# Patient Record
Sex: FEMALE | Race: WHITE | URBAN - METROPOLITAN AREA
[De-identification: names, ages, dates, MRNs, and addresses within clinical notes are randomized per-mention and may not be internally consistent; named-entity substitution may affect disease eponyms.]

---

## 2017-02-05 RX ORDER — VORTIOXETINE 20 MG/1
TABLET, FILM COATED ORAL
Qty: 90 TAB | Refills: 1 | Status: SHIPPED | OUTPATIENT
Start: 2017-02-05 | End: 2017-07-19 | Stop reason: SDUPTHER

## 2017-02-05 RX ORDER — BUPROPION HYDROCHLORIDE 150 MG/1
TABLET ORAL
Qty: 270 TAB | Refills: 0 | Status: SHIPPED | OUTPATIENT
Start: 2017-02-05 | End: 2017-05-21 | Stop reason: SDUPTHER

## 2017-03-16 RX ORDER — SIMVASTATIN 40 MG/1
TABLET, FILM COATED ORAL
Qty: 90 TAB | Refills: 3 | Status: SHIPPED | OUTPATIENT
Start: 2017-03-16 | End: 2018-04-11 | Stop reason: SDUPTHER

## 2017-04-18 RX ORDER — EZETIMIBE 10 MG/1
TABLET ORAL
Qty: 90 TAB | Refills: 2 | Status: SHIPPED | OUTPATIENT
Start: 2017-04-18 | End: 2018-02-18 | Stop reason: SDUPTHER

## 2017-07-19 RX ORDER — VORTIOXETINE 20 MG/1
TABLET, FILM COATED ORAL
Qty: 90 TAB | Refills: 1 | Status: SHIPPED | OUTPATIENT
Start: 2017-07-19 | End: 2018-03-11 | Stop reason: SDUPTHER

## 2017-09-15 ENCOUNTER — OFFICE VISIT (OUTPATIENT)
Dept: INTERNAL MEDICINE CLINIC | Age: 42
End: 2017-09-15

## 2017-09-15 VITALS
HEIGHT: 64 IN | RESPIRATION RATE: 16 BRPM | BODY MASS INDEX: 32.61 KG/M2 | WEIGHT: 191 LBS | SYSTOLIC BLOOD PRESSURE: 124 MMHG | TEMPERATURE: 98.4 F | OXYGEN SATURATION: 99 % | HEART RATE: 108 BPM | DIASTOLIC BLOOD PRESSURE: 86 MMHG

## 2017-09-15 DIAGNOSIS — G47.30 SLEEP APNEA, UNSPECIFIED TYPE: Primary | ICD-10-CM

## 2017-09-15 DIAGNOSIS — Z23 ENCOUNTER FOR IMMUNIZATION: ICD-10-CM

## 2017-09-15 DIAGNOSIS — E66.9 OBESITY (BMI 30-39.9): Chronic | ICD-10-CM

## 2017-09-15 NOTE — PATIENT INSTRUCTIONS
Sleep Apnea: Care Instructions  Your Care Instructions  Sleep apnea means that you frequently stop breathing for 10 seconds or longer during sleep. It can be mild to severe, based on the number of times an hour that you stop breathing or have slowed breathing. Blocked or narrowed airways in your nose, mouth, or throat can cause sleep apnea. Your airway can become blocked when your throat muscles and tongue relax during sleep. You can treat sleep apnea at home by making lifestyle changes. You also can use a CPAP breathing machine that keeps tissues in the throat from blocking your airway. Or your doctor may suggest that you use a breathing device while you sleep. It helps keep your airway open. This could be a device that you put in your mouth. Other examples include strips or disks that you use on your nose. In some cases, surgery may be needed to remove enlarged tissues in the throat. Follow-up care is a key part of your treatment and safety. Be sure to make and go to all appointments, and call your doctor if you are having problems. It's also a good idea to know your test results and keep a list of the medicines you take. How can you care for yourself at home? · Lose weight, if needed. It may reduce the number of times you stop breathing or have slowed breathing. · Sleep on your side. It may stop mild apnea. If you tend to roll onto your back, sew a pocket in the back of your pajama top. Put a tennis ball into the pocket, and stitch the pocket shut. This will help keep you from sleeping on your back. · Avoid alcohol and medicines such as sleeping pills and sedatives before bed. · Do not smoke. Smoking can make sleep apnea worse. If you need help quitting, talk to your doctor about stop-smoking programs and medicines. These can increase your chances of quitting for good. · Prop up the head of your bed 4 to 6 inches by putting bricks under the legs of the bed.   · Treat breathing problems, such as a stuffy nose, caused by a cold or allergies. · Try a continuous positive airway pressure (CPAP) breathing machine if your doctor recommends it. The machine keeps your airway open when you sleep. · If CPAP does not work for you, ask your doctor if you can try other breathing machines. A bilevel positive airway pressure machine uses one type of air pressure for breathing in and another type for breathing out. Another device raises or lowers air pressure as needed while you breathe. · Talk to your doctor if:  ¨ Your nose feels dry or bleeds when you use one of these machines. You may need to increase moisture in the air. A humidifier may help. ¨ Your nose is runny or stuffy from using a breathing machine. Decongestants or a corticosteroid nasal spray may help. ¨ You are sleepy during the day and it gets in the way of the normal things you do. Do not drive when you are drowsy. When should you call for help? Watch closely for changes in your health, and be sure to contact your doctor if:  · You still have sleep apnea even though you have made lifestyle changes. · You are thinking of trying a device such as CPAP. · You are having problems using a CPAP or similar machine. Where can you learn more? Go to http://renetta-guadalupe.info/. Enter K401 in the search box to learn more about \"Sleep Apnea: Care Instructions. \"  Current as of: March 25, 2017  Content Version: 11.3  © 4976-3176 STinser. Care instructions adapted under license by [employer] (which disclaims liability or warranty for this information). If you have questions about a medical condition or this instruction, always ask your healthcare professional. [place] [address] any warranty or liability for your use of this information.

## 2017-09-15 NOTE — PROGRESS NOTES
Lewis Bernard is a 38 y.o. female who presents for evaluation of excessive snoring, daytime fatigue, apnea. Did sleep test about 10 years ago that she states was inconclusive. Would like to re-address at this time. ROS:  Constitutional: negative for fevers, chills, anorexia and weight loss  Eyes:   negative for visual disturbance and irritation  ENT:   negative for tinnitus,sore throat,nasal congestion,ear pain,hoarseness  Respiratory:  negative for cough, hemoptysis, dyspnea,wheezing  CV:   negative for chest pain, palpitations, lower extremity edema  GI:   negative for nausea, vomiting, diarrhea, abdominal pain,melena  Genitourinary: negative for frequency, dysuria and hematuria  Musculoskel: negative for myalgias, arthralgias, back pain, muscle weakness, joint pain  Neurological:  negative for headaches, dizziness, focal weakness, numbness  Psychiatric:     Negative for depression or anxiety      Past Medical History:   Diagnosis Date    Anxiety     Depression     Dr. Suzanne Gunter    Hemorrhage 2002    postpartum (23 units transfused)    Hypercholesterolemia        Past Surgical History:   Procedure Laterality Date    HX ADENOIDECTOMY      +partial tonsillectomy    HX ADENOIDECTOMY      HX  SECTION  2002    HX DILATION AND CURETTAGE      HX HYSTERECTOMY  2002       Family History   Problem Relation Age of Onset    Cancer Mother      lung    COPD Mother     Emphysema Mother     Heart Disease Father     Heart Attack Father     No Known Problems Brother     Heart Attack Paternal Grandfather     Heart Disease Paternal Grandfather        Social History     Social History    Marital status:      Spouse name: N/A    Number of children: N/A    Years of education: N/A     Occupational History    Not on file.      Social History Main Topics    Smoking status: Former Smoker    Smokeless tobacco: Never Used    Alcohol use 3.0 oz/week     6 Cans of beer per week   [facility] Drug use: No    Sexual activity: Yes     Partners: Male     Other Topics Concern    Not on file     Social History Narrative            Visit Vitals    /86 (BP 1 Location: Left arm, BP Patient Position: Sitting)    Pulse (!) 108    Temp 98.4 °F (36.9 °C) (Oral)    Resp 16    Ht 5' 4\" (1.626 m)    Wt 191 lb (86.6 kg)    SpO2 99%    BMI 32.79 kg/m2       Physical Examination:   General - Well appearing female  HEENT - PERRL, TM no erythema/opacification, normal nasal turbinates, no oropharyngeal erythema or exudate, MMM  Neck - supple, no bruits, no thyroidomegaly, no lymphadenopathy  Pulm - clear to auscultation bilaterally  Cardio - RRR, normal S1 S2, no murmur  Abd - soft, nontender, no masses, no HSM  Extrem - no edema, +2 distal pulses  Neuro-  No focal deficits, CN intact     Assessment/Plan:    1. Probable perri--referral to sleep study, dr Bowser Peers  2.  hyperlipids--controlled with zetia and zocor  3. Anxiety and depression--continue wellbutrin, prn xanax  4. Routine adult health maintenance--tdap given today. Declines flu shot. Had mammogram done at [facility], and pap/pelvic with tony ruano.     rtc 6 months        Radha Barry III, DO

## 2017-09-15 NOTE — PROGRESS NOTES
Reviewed record in preparation for visit and have obtained necessary documentation. Identified pt with two pt identifiers(name and ). Chief Complaint   Patient presents with    Hypertension     follow up    Other     wants to discuss having a sleep study done       Health Maintenance Due   Topic Date Due    DTaP/Tdap/Td series (1 - Tdap) 1994    INFLUENZA AGE 9 TO ADULT  2017    PAP AKA CERVICAL CYTOLOGY  2017       Ms. Marcelo Velasco has a reminder for a \"due or due soon\" health maintenance. I have asked that she discuss health maintenance topic(s) due with Her  primary care provider. Coordination of Care Questionnaire:  :     1) Have you been to an emergency room, urgent care clinic since your last visit? yes   Hospitalized since your last visit? no             2) Have you seen or consulted any other health care providers outside of [address] [place] [address] since your last visit? yes  (Include any pap smears or colon screenings in this section.)    3) Do you have an Advance Directive on file? no    4) Are you interested in receiving information on Advance Directives? NO    Patient is accompanied by self I have received verbal consent from Pam Junior to discuss any/all medical information while they are present in the room.

## 2017-09-15 NOTE — MR AVS SNAPSHOT
Visit Information Date & Time Provider Department Dept. Phone Encounter #  
 9/15/2017  1:45 PM Cathy Henry, [address]  [id number] Follow-up Instructions Return if symptoms worsen or fail to improve. Upcoming Health Maintenance Date Due  
 PAP AKA CERVICAL CYTOLOGY 11/24/2017 DTaP/Tdap/Td series (2 - Td) 9/15/2027 Allergies as of 9/15/2017  Review Complete On: 9/15/2017 By: Radha Barry III, DO No Known Allergies Current Immunizations  Never Reviewed Name Date Tdap 9/15/2017 Not reviewed this visit You Were Diagnosed With   
  
 Codes Comments Sleep apnea, unspecified type    -  Primary ICD-10-CM: G47.30 ICD-9-CM: 780.57 Encounter for immunization     ICD-10-CM: R66 ICD-9-CM: V03.89 Obesity (BMI 30-39. 9)     ICD-10-CM: E66.9 ICD-9-CM: 278.00 Vitals BP Pulse Temp Resp Height(growth percentile) Weight(growth percentile) 124/86 (BP 1 Location: Left arm, BP Patient Position: Sitting) (!) 108 98.4 °F (36.9 °C) (Oral) 16 5' 4\" (1.626 m) 191 lb (86.6 kg) SpO2 BMI OB Status Smoking Status 99% 32.79 kg/m2 Hysterectomy Former Smoker Vitals History BMI and BSA Data Body Mass Index Body Surface Area 32.79 kg/m 2 1.98 m 2 Preferred Pharmacy Pharmacy Name Phone [id number] [address] [facility] [phone] Your Updated Medication List  
  
   
This list is accurate as of: 9/15/17  2:19 PM.  Always use your most recent med list.  
  
  
  
  
 ALPRAZolam 0.25 mg tablet Commonly known as:  Trula Crick Take 1 Tab by mouth two (2) times daily as needed for Anxiety. buPROPion  mg tablet Commonly known as:  WELLBUTRIN XL  
TAKE 3 TABS BY MOUTH EVERY MORNING. ezetimibe 10 mg tablet Commonly known as:  Darus Pont TAKE 1 TABLET BY MOUTH EVERY DAY  
  
 multivitamin tablet Commonly known as:  ONE A DAY Take 1 Tab by mouth daily. simvastatin 40 mg tablet Commonly known as:  ZOCOR  
TAKE 1 TAB BY MOUTH NIGHTLY. TRINTELLIX 20 mg tablet Generic drug:  vortioxetine TAKE 1 TABLET BY MOUTH DAILY. valACYclovir 1 gram tablet Commonly known as:  VALTREX  
TAKE 1 TAB BY MOUTH EVERY TWELVE (12) HOURS AS NEEDED. We Performed the Following REFERRAL TO SLEEP STUDIES [REF99 Custom] TETANUS, DIPHTHERIA TOXOIDS AND ACELLULAR PERTUSSIS VACCINE (TDAP), IN INDIVIDS. >=7, IM [id number] CPT(R)] Follow-up Instructions Return if symptoms worsen or fail to improve. Referral Information Referral ID Referred By Referred To  
  
 [id number] Princess Chairez, Nicanor Mendoza MD   
   [address] [place] [address] [address] Phone: [phone] Fax: [phone] Visits Status Start Date End Date 1 New Request 9/15/17 9/15/18 If your referral has a status of pending review or denied, additional information will be sent to support the outcome of this decision. Patient Instructions Sleep Apnea: Care Instructions Your Care Instructions Sleep apnea means that you frequently stop breathing for 10 seconds or longer during sleep. It can be mild to severe, based on the number of times an hour that you stop breathing or have slowed breathing. Blocked or narrowed airways in your nose, mouth, or throat can cause sleep apnea. Your airway can become blocked when your throat muscles and tongue relax during sleep. You can treat sleep apnea at home by making lifestyle changes. You also can use a CPAP breathing machine that keeps tissues in the throat from blocking your airway. Or your doctor may suggest that you use a breathing device while you sleep. It helps keep your airway open. This could be a device that you put in your mouth.  Other examples include strips or disks that you use on your nose. In some cases, surgery may be needed to remove enlarged tissues in the throat. Follow-up care is a key part of your treatment and safety. Be sure to make and go to all appointments, and call your doctor if you are having problems. It's also a good idea to know your test results and keep a list of the medicines you take. How can you care for yourself at home? · Lose weight, if needed. It may reduce the number of times you stop breathing or have slowed breathing. · Sleep on your side. It may stop mild apnea. If you tend to roll onto your back, sew a pocket in the back of your pajama top. Put a tennis ball into the pocket, and stitch the pocket shut. This will help keep you from sleeping on your back. · Avoid alcohol and medicines such as sleeping pills and sedatives before bed. · Do not smoke. Smoking can make sleep apnea worse. If you need help quitting, talk to your doctor about stop-smoking programs and medicines. These can increase your chances of quitting for good. · Prop up the head of your bed 4 to 6 inches by putting bricks under the legs of the bed. · Treat breathing problems, such as a stuffy nose, caused by a cold or allergies. · Try a continuous positive airway pressure (CPAP) breathing machine if your doctor recommends it. The machine keeps your airway open when you sleep. · If CPAP does not work for you, ask your doctor if you can try other breathing machines. A bilevel positive airway pressure machine uses one type of air pressure for breathing in and another type for breathing out. Another device raises or lowers air pressure as needed while you breathe. · Talk to your doctor if: 
¨ Your nose feels dry or bleeds when you use one of these machines. You may need to increase moisture in the air. A humidifier may help. ¨ Your nose is runny or stuffy from using a breathing machine. Decongestants or a corticosteroid nasal spray may help. ¨ You are sleepy during the day and it gets in the way of the normal things you do. Do not drive when you are drowsy. When should you call for help? Watch closely for changes in your health, and be sure to contact your doctor if: 
· You still have sleep apnea even though you have made lifestyle changes. · You are thinking of trying a device such as CPAP. · You are having problems using a CPAP or similar machine. Where can you learn more? Go to http://renetta-guadalupe.info/. Enter W795 in the search box to learn more about \"Sleep Apnea: Care Instructions. \" Current as of: March 25, 2017 Content Version: 11.3 © 1684-7186 Rezzcard. Care instructions adapted under license by [employer] (which disclaims liability or warranty for this information). If you have questions about a medical condition or this instruction, always ask your healthcare professional. [place] [address] any warranty or liability for your use of this information. Introducing [facility] & HEALTH SERVICES! Dear Jose Martin: Thank you for requesting a Vlingo account. Our records indicate that you already have an active Vlingo account. You can access your account anytime at [email or web address] Did you know that you can access your hospital and ER discharge instructions at any time in Vlingo? You can also review all of your test results from your hospital stay or ER visit. Additional Information If you have questions, please visit the Frequently Asked Questions section of the Vlingo website at [email or web address] Remember, Vlingo is NOT to be used for urgent needs. For medical emergencies, dial 911. Now available from your iPhone and Android! Please provide this summary of care documentation to your next provider. Your primary care clinician is listed as Ty Ser  If you have any questions after today's visit, please call [phone].

## 2018-01-09 ENCOUNTER — OFFICE VISIT (OUTPATIENT)
Dept: INTERNAL MEDICINE CLINIC | Age: 43
End: 2018-01-09

## 2018-01-09 VITALS
WEIGHT: 188 LBS | BODY MASS INDEX: 32.1 KG/M2 | TEMPERATURE: 98.9 F | SYSTOLIC BLOOD PRESSURE: 130 MMHG | RESPIRATION RATE: 16 BRPM | DIASTOLIC BLOOD PRESSURE: 89 MMHG | HEIGHT: 64 IN | HEART RATE: 96 BPM | OXYGEN SATURATION: 98 %

## 2018-01-09 DIAGNOSIS — R00.0 TACHYCARDIA: ICD-10-CM

## 2018-01-09 DIAGNOSIS — E66.9 OBESITY (BMI 30-39.9): Chronic | ICD-10-CM

## 2018-01-09 DIAGNOSIS — F32.A DEPRESSION, UNSPECIFIED DEPRESSION TYPE: ICD-10-CM

## 2018-01-09 DIAGNOSIS — E78.2 MIXED HYPERLIPIDEMIA: ICD-10-CM

## 2018-01-09 DIAGNOSIS — R00.0 RACING HEART BEAT: Primary | ICD-10-CM

## 2018-01-09 NOTE — PROGRESS NOTES
Reviewed record in preparation for visit and have obtained necessary documentation. Identified pt with two pt identifiers(name and ). Chief Complaint   Patient presents with    Follow-up       Health Maintenance Due   Topic Date Due    PAP AKA CERVICAL CYTOLOGY  2017       Ms. Edil Taylor has a reminder for a \"due or due soon\" health maintenance. I have asked that she discuss health maintenance topic(s) due with Her  primary care provider. Coordination of Care Questionnaire:  :     1) Have you been to an emergency room, urgent care clinic since your last visit? no   Hospitalized since your last visit? no             2) Have you seen or consulted any other health care providers outside of [address] [place] [address] since your last visit? no  (Include any pap smears or colon screenings in this section.)    3) Do you have an Advance Directive on file? no    4) Are you interested in receiving information on Advance Directives? yes    Patient is accompanied by self I have received verbal consent from Makenzie Pelayo to discuss any/all medical information while they are present in the room.

## 2018-01-09 NOTE — MR AVS SNAPSHOT
Visit Information Date & Time Provider Department Dept. Phone Encounter #  
 1/9/2018  2:00 PM Demian Gale, [address] [place] [address],[address] [phone] [id number] Follow-up Instructions Return in about 6 months (around 7/9/2018). Upcoming Health Maintenance Date Due  
 PAP AKA CERVICAL CYTOLOGY 11/24/2017 DTaP/Tdap/Td series (2 - Td) 9/15/2027 Allergies as of 1/9/2018  Review Complete On: 1/9/2018 By: Deep Cintron III, DO No Known Allergies Current Immunizations  Never Reviewed Name Date Tdap 9/15/2017 Not reviewed this visit You Were Diagnosed With   
  
 Codes Comments Racing heart beat    -  Primary ICD-10-CM: R00.0 ICD-9-CM: 785.0 Obesity (BMI 30-39. 9)     ICD-10-CM: E66.9 ICD-9-CM: 278.00 Mixed hyperlipidemia     ICD-10-CM: E78.2 ICD-9-CM: 272.2 Depression, unspecified depression type     ICD-10-CM: F32.9 ICD-9-CM: 990 Tachycardia     ICD-10-CM: R00.0 ICD-9-CM: 572. 0 Vitals BP Pulse Temp Resp Height(growth percentile) Weight(growth percentile) 130/89 (BP 1 Location: Left arm, BP Patient Position: Sitting) 96 98.9 °F (37.2 °C) (Oral) 16 5' 4\" (1.626 m) 188 lb (85.3 kg) SpO2 BMI OB Status Smoking Status 98% 32.27 kg/m2 Hysterectomy Former Smoker Vitals History BMI and BSA Data Body Mass Index Body Surface Area  
 32.27 kg/m 2 1.96 m 2 [facility]erred Pharmacy Pharmacy Name Phone [id number] [address] [phone] Your Updated Medication List  
  
   
This list is accurate as of: 1/9/18  2:49 PM.  Always use your most recent med list.  
  
  
  
  
 ALPRAZolam 0.25 mg tablet Commonly known as:  Carolyn Glen Spey Take 1 Tab by mouth two (2) times daily as needed for Anxiety. buPROPion  mg tablet Commonly known as:  WELLBUTRIN XL  
TAKE 3 TABS BY MOUTH EVERY MORNING. ezetimibe 10 mg tablet Commonly known as:  Odell Plank TAKE 1 TABLET BY MOUTH EVERY DAY  
  
 multivitamin tablet Commonly known as:  ONE A DAY Take 1 Tab by mouth daily. simvastatin 40 mg tablet Commonly known as:  ZOCOR  
TAKE 1 TAB BY MOUTH NIGHTLY. TRINTELLIX 20 mg tablet Generic drug:  vortioxetine TAKE 1 TABLET BY MOUTH DAILY. valACYclovir 1 gram tablet Commonly known as:  VALTREX  
TAKE 1 TAB BY MOUTH EVERY TWELVE (12) HOURS AS NEEDED. We Performed the Following AMB POC EKG ROUTINE W/ 12 LEADS, SCREEN () [ HCP] CBC WITH AUTOMATED DIFF [43231 CPT(R)] LIPID PANEL [81712 CPT(R)] METABOLIC PANEL, COMPREHENSIVE [02865 CPT(R)] TSH 3RD GENERATION [46853 CPT(R)] Follow-up Instructions Return in about 6 months (around 7/9/2018). Patient Instructions Starting a Weight Loss Plan: Care Instructions Your Care Instructions If you are thinking about losing weight, it can be hard to know where to start. Your doctor can help you set up a weight loss plan that best meets your needs. You may want to take a class on nutrition or exercise, or join a weight loss support group. If you have questions about how to make changes to your eating or exercise habits, ask your doctor about seeing a registered dietitian or an exercise specialist. 
It can be a big challenge to lose weight. But you do not have to make huge changes at once. Make small changes, and stick with them. When those changes become habit, add a few more changes. If you do not think you are ready to make changes right now, try to pick a date in the future. Make an appointment to see your doctor to discuss whether the time is right for you to start a plan. Follow-up care is a key part of your treatment and safety.  Be sure to make and go to all appointments, and call your doctor if you are having problems. It's also a good idea to know your test results and keep a list of the medicines you take. How can you care for yourself at home? · Set realistic goals. Many people expect to lose much more weight than is likely. A weight loss of 5% to 10% of your body weight may be enough to improve your health. · Get family and friends involved to provide support. Talk to them about why you are trying to lose weight, and ask them to help. They can help by participating in exercise and having meals with you, even if they may be eating something different. · Find what works best for you. If you do not have time or do not like to cook, a program that offers meal replacement bars or shakes may be better for you. Or if you like to prepare meals, finding a plan that includes daily menus and recipes may be best. 
· Ask your doctor about other health professionals who can help you achieve your weight loss goals. ¨ A dietitian can help you make healthy changes in your diet. ¨ An exercise specialist or  can help you develop a safe and effective exercise program. 
¨ A counselor or psychiatrist can help you cope with issues such as depression, anxiety, or family problems that can make it hard to focus on weight loss. · Consider joining a support group for people who are trying to lose weight. Your doctor can suggest groups in your area. Where can you learn more? Go to http://renetta-guadalupe.info/. Enter X679 in the search box to learn more about \"Starting a Weight Loss Plan: Care Instructions. \" Current as of: October 13, 2016 Content Version: 11.4 © 9373-1179 Healthwise, Incorporated. Care instructions adapted under license by [employer]9k (which disclaims liability or warranty for this information).  If you have questions about a medical condition or this instruction, always ask your healthcare professional. [employer] disclaims any warranty or liability for your use of this information. Make appt again to be evaluated for sleep apnea. Introducing [facility] SERVICES! Dear Marni Bill: Thank you for requesting a PLASTIQ account. Our records indicate that you already have an active PLASTIQ account. You can access your account anytime at [email or web address] Did you know that you can access your hospital and ER discharge instructions at any time in PLASTIQ? You can also review all of your test results from your hospital stay or ER visit. Additional Information If you have questions, please visit the Frequently Asked Questions section of the PLASTIQ website at [email or web address] Remember, PLASTIQ is NOT to be used for urgent needs. For medical emergencies, dial 911. Now available from your iPhone and Android! Please provide this summary of care documentation to your next provider. Your primary care clinician is listed as Chandrakant Mc If you have any questions after today's visit, please call [phone].

## 2018-01-09 NOTE — PATIENT INSTRUCTIONS

## 2018-01-09 NOTE — PROGRESS NOTES
Charity Osler is a 38 y.o. female who presents for evaluation of heart racing. Been bothering her now for a few months. No cp or shortness of breath. Has not been very active past few months, but got new fit bit for cora, and resting pulse is usually around 100. When she was on treadmill recently, pulse shot up to 180 or so. No symptoms. ROS:  Constitutional: negative for fevers, chills, anorexia and weight loss  Eyes:   negative for visual disturbance and irritation  ENT:   negative for tinnitus,sore throat,nasal congestion,ear pain,hoarseness  Respiratory:  negative for cough, hemoptysis, dyspnea,wheezing  CV:   negative for chest pain, palpitations, lower extremity edema  GI:   negative for nausea, vomiting, diarrhea, abdominal pain,melena  Genitourinary: negative for frequency, dysuria and hematuria  Musculoskel: negative for myalgias, arthralgias, back pain, muscle weakness, joint pain  Neurological:  negative for headaches, dizziness, focal weakness, numbness  Psychiatric:     ++ for depression or anxiety      Past Medical History:   Diagnosis Date    Anxiety     Depression     Dr. Keli Carreno    Hemorrhage 2002    postpartum (23 units transfused)    Hypercholesterolemia        Past Surgical History:   Procedure Laterality Date    HX ADENOIDECTOMY      +partial tonsillectomy    HX ADENOIDECTOMY      HX  SECTION  2002    HX DILATION AND CURETTAGE      HX HYSTERECTOMY  2002       Family History   Problem Relation Age of Onset    Cancer Mother      lung    COPD Mother     Emphysema Mother     Heart Disease Father     Heart Attack Father     No Known Problems Brother     Heart Attack Paternal Grandfather     Heart Disease Paternal Grandfather        Social History     Social History    Marital status:      Spouse name: N/A    Number of children: N/A    Years of education: N/A     Occupational History    Not on file.      Social History Main Topics    Smoking status: Former Smoker    Smokeless tobacco: Never Used    Alcohol use 3.0 oz/week     6 Cans of beer per week    Drug use: No    Sexual activity: Yes     Partners: Male     Other Topics Concern    Not on file     Social History Narrative            Visit Vitals    /89 (BP 1 Location: Left arm, BP Patient Position: Sitting)    Pulse 96    Temp 98.9 °F (37.2 °C) (Oral)    Resp 16    Ht 5' 4\" (1.626 m)    Wt 188 lb (85.3 kg)    SpO2 98%    BMI 32.27 kg/m2       Physical Examination:   General - Well appearing female  HEENT - PERRL, TM no erythema/opacification, normal nasal turbinates, no oropharyngeal erythema or exudate, MMM  Neck - supple, no bruits, no thyroidomegaly, no lymphadenopathy  Pulm - clear to auscultation bilaterally  Cardio - RRR, normal S1 S2, no murmur  Abd - soft, nontender, no masses, no HSM  Extrem - no edema, +2 distal pulses  Neuro-  No focal deficits, CN intact    ecg is nsr, rate 80s. Assessment/Plan:    1. Tachycardia--check 12 lead ecg. Check cbc, tsh  2.  hyperlipids--on zetia and zocor, check flp  3. Anxiety and depression--doing ok with wellbutrin and trintellix. Has only used xanax few times over past few months. 4.  Possible perri--reminded her to reschedule appt with sleep doctor  5. Routine adult health maintenance--due for pap/pelvic end of jan, and mamm scheduled in march. rtc 6 months, sooner if labs abn.         Mel Amezquita III, DO

## 2018-01-10 LAB
ALBUMIN SERPL-MCNC: 4.6 G/DL (ref 3.5–5.5)
ALBUMIN/GLOB SERPL: 1.8 {RATIO} (ref 1.2–2.2)
ALP SERPL-CCNC: 45 IU/L (ref 39–117)
ALT SERPL-CCNC: 31 IU/L (ref 0–32)
AST SERPL-CCNC: 23 IU/L (ref 0–40)
BASOPHILS # BLD AUTO: 0 X10E3/UL (ref 0–0.2)
BASOPHILS NFR BLD AUTO: 0 %
BILIRUB SERPL-MCNC: 0.5 MG/DL (ref 0–1.2)
BUN SERPL-MCNC: 15 MG/DL (ref 6–24)
BUN/CREAT SERPL: 14 (ref 9–23)
CALCIUM SERPL-MCNC: 9.7 MG/DL (ref 8.7–10.2)
CHLORIDE SERPL-SCNC: 100 MMOL/L (ref 96–106)
CHOLEST SERPL-MCNC: 217 MG/DL (ref 100–199)
CO2 SERPL-SCNC: 25 MMOL/L (ref 18–29)
CREAT SERPL-MCNC: 1.07 MG/DL (ref 0.57–1)
EOSINOPHIL # BLD AUTO: 0.1 X10E3/UL (ref 0–0.4)
EOSINOPHIL NFR BLD AUTO: 2 %
ERYTHROCYTE [DISTWIDTH] IN BLOOD BY AUTOMATED COUNT: 12.7 % (ref 12.3–15.4)
GLOBULIN SER CALC-MCNC: 2.6 G/DL (ref 1.5–4.5)
GLUCOSE SERPL-MCNC: 78 MG/DL (ref 65–99)
HCT VFR BLD AUTO: 39.5 % (ref 34–46.6)
HDLC SERPL-MCNC: 58 MG/DL
HGB BLD-MCNC: 13.9 G/DL (ref 11.1–15.9)
IMM GRANULOCYTES # BLD: 0 X10E3/UL (ref 0–0.1)
IMM GRANULOCYTES NFR BLD: 0 %
LDLC SERPL CALC-MCNC: 124 MG/DL (ref 0–99)
LYMPHOCYTES # BLD AUTO: 1.8 X10E3/UL (ref 0.7–3.1)
LYMPHOCYTES NFR BLD AUTO: 31 %
MCH RBC QN AUTO: 31.4 PG (ref 26.6–33)
MCHC RBC AUTO-ENTMCNC: 35.2 G/DL (ref 31.5–35.7)
MCV RBC AUTO: 89 FL (ref 79–97)
MONOCYTES # BLD AUTO: 0.5 X10E3/UL (ref 0.1–0.9)
MONOCYTES NFR BLD AUTO: 9 %
NEUTROPHILS # BLD AUTO: 3.3 X10E3/UL (ref 1.4–7)
NEUTROPHILS NFR BLD AUTO: 58 %
PLATELET # BLD AUTO: 271 X10E3/UL (ref 150–379)
POTASSIUM SERPL-SCNC: 4.3 MMOL/L (ref 3.5–5.2)
PROT SERPL-MCNC: 7.2 G/DL (ref 6–8.5)
RBC # BLD AUTO: 4.42 X10E6/UL (ref 3.77–5.28)
SODIUM SERPL-SCNC: 140 MMOL/L (ref 134–144)
TRIGL SERPL-MCNC: 177 MG/DL (ref 0–149)
TSH SERPL DL<=0.005 MIU/L-ACNC: 2.59 UIU/ML (ref 0.45–4.5)
VLDLC SERPL CALC-MCNC: 35 MG/DL (ref 5–40)
WBC # BLD AUTO: 5.7 X10E3/UL (ref 3.4–10.8)

## 2018-01-11 NOTE — PROGRESS NOTES
Cholesterol levels bit worse, otherwise, labs look ok. Make sure taking zetia and zocor every day. Form filled out for her biometric screening. Please fax for her.

## 2018-02-18 RX ORDER — EZETIMIBE 10 MG/1
TABLET ORAL
Qty: 90 TAB | Refills: 2 | Status: SHIPPED | OUTPATIENT
Start: 2018-02-18 | End: 2018-11-08 | Stop reason: SDUPTHER

## 2018-03-11 RX ORDER — VORTIOXETINE 20 MG/1
TABLET, FILM COATED ORAL
Qty: 90 TAB | Refills: 1 | Status: SHIPPED | OUTPATIENT
Start: 2018-03-11 | End: 2018-09-14 | Stop reason: SDUPTHER

## 2018-04-11 RX ORDER — SIMVASTATIN 40 MG/1
TABLET, FILM COATED ORAL
Qty: 90 TAB | Refills: 3 | Status: SHIPPED | OUTPATIENT
Start: 2018-04-11 | End: 2018-04-23 | Stop reason: SDUPTHER

## 2018-04-23 RX ORDER — SIMVASTATIN 40 MG/1
40 TABLET, FILM COATED ORAL
Qty: 90 TAB | Refills: 3 | Status: SHIPPED | OUTPATIENT
Start: 2018-04-23 | End: 2019-04-25 | Stop reason: SDUPTHER

## 2018-04-23 NOTE — TELEPHONE ENCOUNTER
From: Jhonny Ghotra  To:  Albert Boothe III, DO  Sent: 4/23/2018 10:11 AM EDT  Subject: Medication Renewal Request    Original authorizing provider: DO Jhonny Lal III would like a refill of the following medications:  simvastatin (ZOCOR) 40 mg tablet Albert Boothe III, DO]    Preferred pharmacy: [id number]- [id number]    Comment:  Please refill my Simvastatin 40 MG RX, to [facility] @ [address] [place] [address]

## 2018-06-21 RX ORDER — BUPROPION HYDROCHLORIDE 150 MG/1
TABLET ORAL
Qty: 270 TAB | Refills: 3 | Status: SHIPPED | OUTPATIENT
Start: 2018-06-21 | End: 2019-06-04 | Stop reason: SDUPTHER

## 2018-09-14 RX ORDER — VORTIOXETINE 20 MG/1
TABLET, FILM COATED ORAL
Qty: 90 TAB | Refills: 1 | Status: SHIPPED | OUTPATIENT
Start: 2018-09-14 | End: 2019-03-03 | Stop reason: SDUPTHER

## 2019-02-11 ENCOUNTER — OFFICE VISIT (OUTPATIENT)
Dept: INTERNAL MEDICINE CLINIC | Age: 44
End: 2019-02-11

## 2019-02-11 VITALS
WEIGHT: 192 LBS | TEMPERATURE: 98.2 F | RESPIRATION RATE: 16 BRPM | OXYGEN SATURATION: 99 % | BODY MASS INDEX: 32.78 KG/M2 | DIASTOLIC BLOOD PRESSURE: 90 MMHG | SYSTOLIC BLOOD PRESSURE: 140 MMHG | HEART RATE: 83 BPM | HEIGHT: 64 IN

## 2019-02-11 DIAGNOSIS — I49.9 CARDIAC ARRHYTHMIA, UNSPECIFIED CARDIAC ARRHYTHMIA TYPE: ICD-10-CM

## 2019-02-11 DIAGNOSIS — G47.30 SLEEP APNEA, UNSPECIFIED TYPE: ICD-10-CM

## 2019-02-11 DIAGNOSIS — E66.9 OBESITY (BMI 30-39.9): ICD-10-CM

## 2019-02-11 DIAGNOSIS — Z00.00 ROUTINE ADULT HEALTH MAINTENANCE: Primary | ICD-10-CM

## 2019-02-11 DIAGNOSIS — F32.A ANXIETY AND DEPRESSION: ICD-10-CM

## 2019-02-11 DIAGNOSIS — E78.2 MIXED HYPERLIPIDEMIA: ICD-10-CM

## 2019-02-11 DIAGNOSIS — F41.9 ANXIETY AND DEPRESSION: ICD-10-CM

## 2019-02-11 RX ORDER — ALPRAZOLAM 0.25 MG/1
0.25 TABLET ORAL
Qty: 60 TAB | Refills: 1 | Status: SHIPPED | OUTPATIENT
Start: 2019-02-11 | End: 2020-03-20 | Stop reason: SDUPTHER

## 2019-02-11 NOTE — PROGRESS NOTES
Pam Junior is a 37 y.o. female who presents for evaluation of cpe. Last seen by me 2018. Saw cardio, dr Jesse Fowler in nov for arrhythmia, thought afib. Workup with him was normal, and he did not think it was afib. He gave her rx for bystolic, but she is no longer taking it. Also concerned with weight gain, has gained 4 lbs since last visit. Never saw sleep doctor. Doing well though with her anxiety and depression meds.       ROS:  Constitutional: negative for fevers, chills, anorexia and weight loss  Eyes:   negative for visual disturbance and irritation  ENT:   negative for tinnitus,sore throat,nasal congestion,ear pain,hoarseness  Respiratory:  negative for cough, hemoptysis, dyspnea,wheezing  CV:   negative for chest pain, palpitations, lower extremity edema  GI:   negative for nausea, vomiting, diarrhea, abdominal pain,melena  Genitourinary: negative for frequency, dysuria and hematuria  Musculoskel: negative for myalgias, arthralgias, back pain, muscle weakness, joint pain  Neurological:  negative for headaches, dizziness, focal weakness, numbness  Psychiatric:     Negative for depression or anxiety      Past Medical History:   Diagnosis Date    Anxiety     Depression     Dr. Ga Lancaster    Hemorrhage 2002    postpartum (23 units transfused)    Hypercholesterolemia        Past Surgical History:   Procedure Laterality Date    HX ADENOIDECTOMY      +partial tonsillectomy    HX ADENOIDECTOMY      HX  SECTION  2002    HX DILATION AND CURETTAGE      HX HYSTERECTOMY  2002       Family History   Problem Relation Age of Onset    Cancer Mother         lung    COPD Mother     Emphysema Mother     Heart Disease Father     Heart Attack Father     No Known Problems Brother     Heart Attack Paternal Grandfather     Heart Disease Paternal Grandfather        Social History     Socioeconomic History    Marital status:      Spouse name: Not on file    Number of children: Not on file    Years of education: Not on file    Highest education level: Not on file   Social Needs    Financial resource strain: Not on file    Food insecurity - worry: Not on file    Food insecurity - inability: Not on file    Transportation needs - medical: Not on file   [employer] needs - non-medical: Not on file   Occupational History    Not on file   Tobacco Use    Smoking status: Former Smoker    Smokeless tobacco: Never Used   Substance and Sexual Activity    Alcohol use: Yes     Alcohol/week: 3.0 oz     Types: 6 Cans of beer per week    Drug use: No    Sexual activity: Yes     Partners: Male   Other Topics Concern    Not on file   Social History Narrative    Not on file            Visit Vitals  /90 (BP 1 Location: Right arm, BP Patient Position: Sitting)   Pulse 83   Temp 98.2 °F (36.8 °C) (Oral)   Resp 16   Ht 5' 4\" (1.626 m)   Wt 192 lb (87.1 kg)   SpO2 99%   BMI 32.96 kg/m²       Physical Examination:   General - Well appearing female  HEENT - PERRL, TM no erythema/opacification, normal nasal turbinates, no oropharyngeal erythema or exudate, MMM  Neck - supple, no bruits, no thyroidomegaly, no lymphadenopathy  Pulm - clear to auscultation bilaterally  Cardio - RRR, normal S1 S2, no murmur  Abd - soft, nontender, no masses, no HSM  Extrem - no edema, +2 distal pulses  Neuro-  No focal deficits, CN intact     Assessment/Plan:    1. Routine adult health maintenance--check cbc, cmp, flp, tsh  2.  Elevated blood pressure--asked her to monitor at home, and notify me if consistently above 135/85  3. Possible perri--referral again to sleep team  4. Possible arrhythmia--needs to have sleep evaluated. Cardiac workup was normal.   Not taking bystolic any longer  5.  hyperlipids--check flp, cmp. On zetia and zocor  6. Anxiety and depression--doing well with wellbutrin, trintellix, prn xanax. Her old psychiatrist, dr Maris Grajeda, no longer available  7.   Obesity--referral to nutrition, fermin joyner    Had pap/pelvic recently by dr Maria Guadalupe Head. Due for mamm in march, parides.   rtc 6 months        Farhat Anglin III, DO

## 2019-02-11 NOTE — PROGRESS NOTES
Reviewed record in preparation for visit and have obtained necessary documentation. Identified pt with two pt identifiers(name and ). Chief Complaint   Patient presents with    Annual Exam       Health Maintenance Due   Topic Date Due    Influenza Age 5 to Adult  2018       Ms. Hemal Du has a reminder for a \"due or due soon\" health maintenance. I have asked that she discuss health maintenance topic(s) due with Her  primary care provider. Coordination of Care Questionnaire:  :     1) Have you been to an emergency room, urgent care clinic since your last visit? yes   Hospitalized since your last visit? no             2) Have you seen or consulted any other health care providers outside of [address] [place] [address] since your last visit? no  (Include any pap smears or colon screenings in this section.)    3) Do you have an Advance Directive on file? no    4) Are you interested in receiving information on Advance Directives? yes    Patient is accompanied by self I have received verbal consent from Yumiko Rudolph to discuss any/all medical information while they are present in the room.

## 2019-02-11 NOTE — PATIENT INSTRUCTIONS
DASH Diet: Care Instructions  Your Care Instructions    The DASH diet is an eating plan that can help lower your blood pressure. DASH stands for Dietary Approaches to Stop Hypertension. Hypertension is high blood pressure. The DASH diet focuses on eating foods that are high in calcium, potassium, and magnesium. These nutrients can lower blood pressure. The foods that are highest in these nutrients are fruits, vegetables, low-fat dairy products, nuts, seeds, and legumes. But taking calcium, potassium, and magnesium supplements instead of eating foods that are high in those nutrients does not have the same effect. The DASH diet also includes whole grains, fish, and poultry. The DASH diet is one of several lifestyle changes your doctor may recommend to lower your high blood pressure. Your doctor may also want you to decrease the amount of sodium in your diet. Lowering sodium while following the DASH diet can lower blood pressure even further than just the DASH diet alone. Follow-up care is a key part of your treatment and safety. Be sure to make and go to all appointments, and call your doctor if you are having problems. It's also a good idea to know your test results and keep a list of the medicines you take. How can you care for yourself at home? Following the DASH diet  · Eat 4 to 5 servings of fruit each day. A serving is 1 medium-sized piece of fruit, ½ cup chopped or canned fruit, 1/4 cup dried fruit, or 4 ounces (½ cup) of fruit juice. Choose fruit more often than fruit juice. · Eat 4 to 5 servings of vegetables each day. A serving is 1 cup of lettuce or raw leafy vegetables, ½ cup of chopped or cooked vegetables, or 4 ounces (½ cup) of vegetable juice. Choose vegetables more often than vegetable juice. · Get 2 to 3 servings of low-fat and fat-free dairy each day. A serving is 8 ounces of milk, 1 cup of yogurt, or 1 ½ ounces of cheese. · Eat 6 to 8 servings of grains each day.  A serving is 1 slice of bread, 1 ounce of dry cereal, or ½ cup of cooked rice, pasta, or cooked cereal. Try to choose whole-grain products as much as possible. · Limit lean meat, poultry, and fish to 2 servings each day. A serving is 3 ounces, about the size of a deck of cards. · Eat 4 to 5 servings of nuts, seeds, and legumes (cooked dried beans, lentils, and split peas) each week. A serving is 1/3 cup of nuts, 2 tablespoons of seeds, or ½ cup of cooked beans or peas. · Limit fats and oils to 2 to 3 servings each day. A serving is 1 teaspoon of vegetable oil or 2 tablespoons of salad dressing. · Limit sweets and added sugars to 5 servings or less a week. A serving is 1 tablespoon jelly or jam, ½ cup sorbet, or 1 cup of lemonade. · Eat less than 2,300 milligrams (mg) of sodium a day. If you limit your sodium to 1,500 mg a day, you can lower your blood pressure even more. Tips for success  · Start small. Do not try to make dramatic changes to your diet all at once. You might feel that you are missing out on your favorite foods and then be more likely to not follow the plan. Make small changes, and stick with them. Once those changes become habit, add a few more changes. · Try some of the following:  ? Make it a goal to eat a fruit or vegetable at every meal and at snacks. This will make it easy to get the recommended amount of fruits and vegetables each day. ? Try yogurt topped with fruit and nuts for a snack or healthy dessert. ? Add lettuce, tomato, cucumber, and onion to sandwiches. ? Combine a ready-made pizza crust with low-fat mozzarella cheese and lots of vegetable toppings. Try using tomatoes, squash, spinach, broccoli, carrots, cauliflower, and onions. ? Have a variety of cut-up vegetables with a low-fat dip as an appetizer instead of chips and dip. ? Sprinkle sunflower seeds or chopped almonds over salads. Or try adding chopped walnuts or almonds to cooked vegetables.   ? Try some vegetarian meals using beans and peas. Add garbanzo or kidney beans to salads. Make burritos and tacos with mashed acosta beans or black beans. Where can you learn more? Go to http://renetta-guadalupe.info/. Enter W219 in the search box to learn more about \"DASH Diet: Care Instructions. \"  Current as of: July 22, 2018  Content Version: 11.9  © 9686-9597 [employer]. Care instructions adapted under license by [employer] (which disclaims liability or warranty for this information). If you have questions about a medical condition or this instruction, always ask your healthcare professional. [address] any warranty or liability for your use of this information. Home Blood Pressure Test: About This Test  What is it? A home blood pressure test allows you to keep track of your blood pressure at home. Blood pressure is a measure of the force of blood against the walls of your arteries. Blood pressure readings include two numbers, such as 130/80 (say \"130 over 80\"). The first number is the systolic pressure. The second number is the diastolic pressure. Why is this test done? You may do this test at home to:  · Find out if you have high blood pressure. · Track your blood pressure if you have high blood pressure. · Track how well medicine is working to reduce high blood pressure. · Check how lifestyle changes, such as weight loss and exercise, are affecting blood pressure. How can you prepare for the test?  · Do not use caffeine, tobacco, or medicines known to raise blood pressure (such as nasal decongestant sprays) for at least 30 minutes before taking your blood pressure. · Do not exercise for at least 30 minutes before taking your blood pressure. What happens before the test?  Take your blood pressure while you feel comfortable and relaxed.  Sit quietly with both feet on the floor for at least 5 minutes before the test.  What happens during the test?  · Sit with your arm slightly bent and resting on a table so that your upper arm is at the same level as your heart. · Roll up your sleeve or take off your shirt to expose your upper arm. · Wrap the blood pressure cuff around your upper arm so that the lower edge of the cuff is about 1 inch above the bend of your elbow. Proceed with the following steps depending on if you are using an automatic or manual pressure monitor. Automatic blood pressure monitors  · Press the on/off button on the automatic monitor and wait until the ready-to-measure \"heart\" symbol appears next to zero in the display window. · Press the start button. The cuff will inflate and deflate by itself. · Your blood pressure numbers will appear on the screen. · Write your numbers in your log book, along with the date and time. Manual blood pressure monitors  · Place the earpieces of a stethoscope in your ears, and place the bell of the stethoscope over the artery, just below the cuff. · Close the valve on the rubber inflating bulb. · Squeeze the bulb rapidly with your opposite hand to inflate the cuff until the dial or column of mercury reads about 30 mm Hg higher than your usual systolic pressure. If you do not know your usual pressure, inflate the cuff to 210 mm Hg or until the pulse at your wrist disappears. · Open the pressure valve just slightly by twisting or pressing the valve on the bulb. · As you watch the pressure slowly fall, note the level on the dial at which you first start to hear a pulsing or tapping sound through the stethoscope. This is your systolic blood pressure. · Continue letting the air out slowly. The sounds will become muffled and will finally disappear. Note the pressure when the sounds completely disappear. This is your diastolic blood pressure. Let out all the remaining air. · Write your numbers in your log book, along with the date and time.   What else should you know about the test?  It is more accurate to take the average of several readings made throughout the day than to rely on a single reading. It's normal for blood pressure to go up and down throughout the day. Follow-up care is a key part of your treatment and safety. Be sure to make and go to all appointments, and call your doctor if you are having problems. It's also a good idea to keep a list of the medicines you take. Where can you learn more? Go to http://renetta-guadalupe.info/. Enter C427 in the search box to learn more about \"Home Blood Pressure Test: About This Test.\"  Current as of: July 22, 2018  Content Version: 11.9  © 1177-4267 [email or web address]. Care instructions adapted under license by [employer] (which disclaims liability or warranty for this information). If you have questions about a medical condition or this instruction, always ask your healthcare professional. [address] any warranty or liability for your use of this information. Try to follow bp at home, few times each week. Notify me if consistently above 135/85.

## 2019-02-12 LAB
ALBUMIN SERPL-MCNC: 4.7 G/DL (ref 3.5–5.5)
ALBUMIN/GLOB SERPL: 1.9 {RATIO} (ref 1.2–2.2)
ALP SERPL-CCNC: 44 IU/L (ref 39–117)
ALT SERPL-CCNC: 33 IU/L (ref 0–32)
AST SERPL-CCNC: 27 IU/L (ref 0–40)
BASOPHILS # BLD AUTO: 0 X10E3/UL (ref 0–0.2)
BASOPHILS NFR BLD AUTO: 1 %
BILIRUB SERPL-MCNC: 0.4 MG/DL (ref 0–1.2)
BUN SERPL-MCNC: 14 MG/DL (ref 6–24)
BUN/CREAT SERPL: 13 (ref 9–23)
CALCIUM SERPL-MCNC: 9.4 MG/DL (ref 8.7–10.2)
CHLORIDE SERPL-SCNC: 102 MMOL/L (ref 96–106)
CHOLEST SERPL-MCNC: 197 MG/DL (ref 100–199)
CO2 SERPL-SCNC: 24 MMOL/L (ref 20–29)
CREAT SERPL-MCNC: 1.09 MG/DL (ref 0.57–1)
EOSINOPHIL # BLD AUTO: 0.1 X10E3/UL (ref 0–0.4)
EOSINOPHIL NFR BLD AUTO: 2 %
ERYTHROCYTE [DISTWIDTH] IN BLOOD BY AUTOMATED COUNT: 12.8 % (ref 12.3–15.4)
EST. AVERAGE GLUCOSE BLD GHB EST-MCNC: 88 MG/DL
GLOBULIN SER CALC-MCNC: 2.5 G/DL (ref 1.5–4.5)
GLUCOSE SERPL-MCNC: 92 MG/DL (ref 65–99)
HBA1C MFR BLD: 4.7 % (ref 4.8–5.6)
HCT VFR BLD AUTO: 40 % (ref 34–46.6)
HDLC SERPL-MCNC: 68 MG/DL
HGB BLD-MCNC: 13.3 G/DL (ref 11.1–15.9)
IMM GRANULOCYTES # BLD AUTO: 0 X10E3/UL (ref 0–0.1)
IMM GRANULOCYTES NFR BLD AUTO: 0 %
LDLC SERPL CALC-MCNC: 89 MG/DL (ref 0–99)
LYMPHOCYTES # BLD AUTO: 1.5 X10E3/UL (ref 0.7–3.1)
LYMPHOCYTES NFR BLD AUTO: 35 %
MCH RBC QN AUTO: 31.1 PG (ref 26.6–33)
MCHC RBC AUTO-ENTMCNC: 33.3 G/DL (ref 31.5–35.7)
MCV RBC AUTO: 94 FL (ref 79–97)
MONOCYTES # BLD AUTO: 0.3 X10E3/UL (ref 0.1–0.9)
MONOCYTES NFR BLD AUTO: 6 %
NEUTROPHILS # BLD AUTO: 2.5 X10E3/UL (ref 1.4–7)
NEUTROPHILS NFR BLD AUTO: 56 %
PLATELET # BLD AUTO: 300 X10E3/UL (ref 150–379)
POTASSIUM SERPL-SCNC: 4.4 MMOL/L (ref 3.5–5.2)
PROT SERPL-MCNC: 7.2 G/DL (ref 6–8.5)
RBC # BLD AUTO: 4.27 X10E6/UL (ref 3.77–5.28)
SODIUM SERPL-SCNC: 139 MMOL/L (ref 134–144)
TRIGL SERPL-MCNC: 198 MG/DL (ref 0–149)
TSH SERPL DL<=0.005 MIU/L-ACNC: 2.32 UIU/ML (ref 0.45–4.5)
VLDLC SERPL CALC-MCNC: 40 MG/DL (ref 5–40)
WBC # BLD AUTO: 4.4 X10E3/UL (ref 3.4–10.8)

## 2019-02-12 NOTE — PROGRESS NOTES
I have attempted to contact this patient by phone with the following results:   Labs look good. Cholesterol levels much improved from last year. Continue with same medications. Results mailed to patient's home.

## 2019-02-21 ENCOUNTER — PATIENT MESSAGE (OUTPATIENT)
Dept: INTERNAL MEDICINE CLINIC | Age: 44
End: 2019-02-21

## 2019-02-21 RX ORDER — NEBIVOLOL 10 MG/1
10 TABLET ORAL DAILY
Qty: 90 TAB | Refills: 3 | Status: SHIPPED | OUTPATIENT
Start: 2019-02-21 | End: 2020-03-08

## 2019-03-25 ENCOUNTER — PATIENT MESSAGE (OUTPATIENT)
Dept: INTERNAL MEDICINE CLINIC | Age: 44
End: 2019-03-25

## 2019-03-25 RX ORDER — ONDANSETRON 4 MG/1
8 TABLET, FILM COATED ORAL
Qty: 30 TAB | Refills: 1 | OUTPATIENT
Start: 2019-03-25

## 2019-03-25 RX ORDER — ONDANSETRON 4 MG/1
8 TABLET, FILM COATED ORAL
COMMUNITY
Start: 2019-01-30 | End: 2019-03-26 | Stop reason: SDUPTHER

## 2019-03-25 NOTE — TELEPHONE ENCOUNTER
From: Elyssa Nelson  To: Meryl Wells,   Sent: 3/25/2019 8:21 AM EDT  Subject: Prescription Question    Hello! 3/20 I was vomiting & diarrhea from 6am to 1:30pm, I had an old Rx for Zofran 4mg which allowed me to stop vomiting, now out. Went to [facility] for sinus infection on 3/21, got antibiotics (/70). This past weekend I've still been off and on nauseous. 3/24 evening I vomited again w/diarrhea, only once; this morning still funky, but not vomiting. Is it possible to get a refill of Zofran 4mg. Also, does this sound normal or do I need to come in and get tests run?

## 2019-03-26 RX ORDER — ONDANSETRON 4 MG/1
8 TABLET, FILM COATED ORAL
Qty: 45 TAB | Refills: 1 | Status: SHIPPED | OUTPATIENT
Start: 2019-03-26 | End: 2019-08-12

## 2019-04-25 RX ORDER — SIMVASTATIN 40 MG/1
40 TABLET, FILM COATED ORAL
Qty: 90 TAB | Refills: 3 | Status: SHIPPED | OUTPATIENT
Start: 2019-04-25 | End: 2019-06-11 | Stop reason: SDUPTHER

## 2019-05-14 ENCOUNTER — HOSPITAL ENCOUNTER (OUTPATIENT)
Dept: SLEEP MEDICINE | Age: 44
Discharge: HOME | End: 2019-05-14
Payer: COMMERCIAL

## 2019-05-14 ENCOUNTER — OFFICE VISIT (OUTPATIENT)
Dept: SLEEP MEDICINE | Age: 44
End: 2019-05-14

## 2019-05-14 VITALS
HEART RATE: 87 BPM | OXYGEN SATURATION: 97 % | WEIGHT: 189 LBS | RESPIRATION RATE: 19 BRPM | DIASTOLIC BLOOD PRESSURE: 75 MMHG | SYSTOLIC BLOOD PRESSURE: 110 MMHG | BODY MASS INDEX: 32.27 KG/M2 | HEIGHT: 64 IN

## 2019-05-14 DIAGNOSIS — F51.01 PRIMARY INSOMNIA: ICD-10-CM

## 2019-05-14 DIAGNOSIS — G47.33 OSA (OBSTRUCTIVE SLEEP APNEA): Primary | ICD-10-CM

## 2019-05-14 PROCEDURE — 95806 SLEEP STUDY UNATT&RESP EFFT: CPT | Performed by: SPECIALIST

## 2019-05-14 NOTE — PROGRESS NOTES
[facility] [place]. [place], [address]  Tel.  [phone]  Fax. 100 [facility] 60  [place], [address] [facility]  Tel.  [phone]  Fax. [phone] [address]   Tel.  [phone]  Fax. [phone]       Chief Complaint       Chief Complaint   Patient presents with    Sleep Problem     Np refd by Dr. Kevin Lopez for snoring and fatigue       HPI      Feliciano Santiago is 37 y.o. female seen for evaluation of a sleep disorder. She notes a history of difficulty with sleep initiation and maintenance. She has a long history of snoring. She states she had a evaluation 15 years ago which, at that time, she was told was unremarkable although apparent apneas were noted. More recently she has been experiencing problems with snoring and nocturnal awakening. She will generally retire at 10 PM and may have some difficulties falling asleep. She will awaken at 7 AM with an alarm. She will awaken 3-4 times during the night. She is tired on awakening and during the day. She may doze if she is seated and inactive such as when reading. She does note episodes of vivid dreaming. She denies sleep talking or sleepwalking, nightmares, abnormal arm movements, hypnagogic hallucinations, sleep paralysis or cataplexy. The patient has not undergone diagnostic testing for the current problems. [place] Sleepiness Score: 10       No Known Allergies    Current Outpatient Medications   Medication Sig Dispense Refill    simvastatin (ZOCOR) 40 mg tablet TAKE 1 TAB BY MOUTH NIGHTLY. 90 Tab 3    ondansetron hcl (ZOFRAN) 4 mg tablet Take 2 Tabs by mouth three (3) times daily as needed for Nausea. 45 Tab 1    TRINTELLIX 20 mg tablet TAKE 1 TABLET BY MOUTH DAILY. 90 Tab 3    nebivolol (BYSTOLIC) 10 mg tablet Take 1 Tab by mouth daily. 90 Tab 3    ALPRAZolam (XANAX) 0.25 mg tablet Take 1 Tab by mouth two (2) times daily as needed for Anxiety.  61 Tab 1    ezetimibe (ZETIA) 10 mg tablet TAKE 1 TABLET BY MOUTH EVERY DAY 90 Tab 3    buPROPion XL (WELLBUTRIN XL) 150 mg tablet TAKE 3 TABS BY MOUTH EVERY MORNING. 270 Tab 3    valACYclovir (VALTREX) 1 gram tablet TAKE 1 TAB BY MOUTH EVERY TWELVE (12) HOURS AS NEEDED. 90 Tab 0    multivitamin (ONE A DAY) tablet Take 1 Tab by mouth daily. She  has a past medical history of Anxiety, Depression, Hemorrhage (2002), and Hypercholesterolemia. She  has a past surgical history that includes hx adenoidectomy; hx  section (2002); hx hysterectomy (2002); hx adenoidectomy; and hx dilation and curettage. She family history includes COPD in her mother; Cancer in her mother; Emphysema in her mother; Heart Attack in her father and paternal grandfather; Heart Disease in her father and paternal grandfather; No Known Problems in her brother. She  reports that she has quit smoking. She has never used smokeless tobacco. She reports that she drinks about 3.0 oz of alcohol per week. She reports that she does not use drugs. Review of Systems:  Review of Systems   Constitutional: Negative for chills and fever. HENT: Negative for hearing loss and tinnitus. Eyes: Negative for blurred vision and double vision. Respiratory: Negative for cough and shortness of breath. Cardiovascular: Negative for chest pain and palpitations. Gastrointestinal: Negative for abdominal pain and heartburn. Genitourinary: Negative for frequency and urgency. Musculoskeletal: Negative for back pain and neck pain. Skin: Negative for itching and rash. Neurological: Negative for dizziness and headaches. Objective:     Visit Vitals  /75 (BP 1 Location: Left arm, BP Patient Position: Sitting)   Pulse 87   Resp 19   Ht 5' 4\" (1.626 m)   Wt 189 lb (85.7 kg)   SpO2 97%   BMI 32.44 kg/m²     Body mass index is 32.44 kg/m².     General:   Conversant, cooperative   Eyes:  Pupils equal and reactive, no nystagmus   Oropharynx:   Mallampati score III, tongue scalloped   Tonsils:     Neck:   No carotid bruits; Neck circ. in \"inches\": 15   Chest/Lungs:  Clear on auscultation    CVS:  Normal rate, regular rhythm   Skin:  Warm to touch; no obvious rashes   Neuro:  Speech fluent, face symmetrical, tongue movement normal   Psych:  Normal affect,  normal countenance        Assessment:       ICD-10-CM ICD-9-CM    1. ALONA (obstructive sleep apnea) G47.33 327.23 SLEEP STUDY UNATTENDED, 4 CHANNEL   2. Primary insomnia F51.01 307.42      History consistent with sleep disordered breathing characterized by snoring, associated apnea, nocturnal awakening and daytime fatigue. She will be evaluated with a home sleep test.  The results will be reviewed with her. Potential sleep onset insomnia. Benefit of cognitive behavioral therapy (CBT-I) was discussed. This will be considered if sleep onset difficulties persist once CPAP is started. Plan:     Orders Placed This Encounter    SLEEP STUDY UNATTENDED, 4 CHANNEL     Order Specific Question:   Reason for Exam     Answer:   Snoring, associated apnea       * Patient has a history and examination consistent with the diagnosis of sleep apnea. *Home sleep testing was ordered for initial evaluation. * She was provided information on sleep apnea including corresponding risk factors and the importance of proper treatment. * Treatment options if indicated were reviewed today. Instructions:  o The patient would benefit from weight reduction measures. o Do not engage in activities requiring a normal degree of alertness if fatigue is present. o The patient understands that untreated or undertreated sleep apnea could impair judgement and the ability to function normally during the day.  o Call or return if symptoms worsen or persist.          Randy Terry MD, FAASM  Electronically signed 05/14/19       This note was created using voice recognition software.  Despite editing, there may be syntax errors. This note will not be viewable in [address]

## 2019-05-14 NOTE — PATIENT INSTRUCTIONS

## 2019-05-15 ENCOUNTER — DOCUMENTATION ONLY (OUTPATIENT)
Dept: SLEEP MEDICINE | Age: 44
End: 2019-05-15

## 2019-05-17 ENCOUNTER — DOCUMENTATION ONLY (OUTPATIENT)
Dept: SLEEP MEDICINE | Age: 44
End: 2019-05-17

## 2019-06-04 RX ORDER — BUPROPION HYDROCHLORIDE 150 MG/1
TABLET ORAL
Qty: 270 TAB | Refills: 3 | Status: SHIPPED | OUTPATIENT
Start: 2019-06-04 | End: 2020-08-27

## 2019-06-11 RX ORDER — VALACYCLOVIR HYDROCHLORIDE 1 G/1
TABLET, FILM COATED ORAL
Qty: 90 TAB | Refills: 0 | Status: SHIPPED | OUTPATIENT
Start: 2019-06-11 | End: 2019-07-07 | Stop reason: SDUPTHER

## 2019-06-11 RX ORDER — SIMVASTATIN 40 MG/1
40 TABLET, FILM COATED ORAL
Qty: 90 TAB | Refills: 3 | Status: SHIPPED | OUTPATIENT
Start: 2019-06-11 | End: 2020-06-08

## 2019-07-01 ENCOUNTER — DOCUMENTATION ONLY (OUTPATIENT)
Dept: SLEEP MEDICINE | Age: 44
End: 2019-07-01

## 2019-07-07 RX ORDER — VALACYCLOVIR HYDROCHLORIDE 1 G/1
TABLET, FILM COATED ORAL
Qty: 60 TAB | Refills: 1 | Status: SHIPPED | OUTPATIENT
Start: 2019-07-07

## 2019-08-12 ENCOUNTER — OFFICE VISIT (OUTPATIENT)
Dept: INTERNAL MEDICINE CLINIC | Age: 44
End: 2019-08-12

## 2019-08-12 VITALS
TEMPERATURE: 98.2 F | WEIGHT: 187 LBS | HEART RATE: 71 BPM | RESPIRATION RATE: 16 BRPM | BODY MASS INDEX: 31.92 KG/M2 | SYSTOLIC BLOOD PRESSURE: 113 MMHG | OXYGEN SATURATION: 98 % | DIASTOLIC BLOOD PRESSURE: 77 MMHG | HEIGHT: 64 IN

## 2019-08-12 DIAGNOSIS — E66.9 OBESITY (BMI 30-39.9): ICD-10-CM

## 2019-08-12 DIAGNOSIS — G47.33 OSA ON CPAP: Primary | Chronic | ICD-10-CM

## 2019-08-12 DIAGNOSIS — F41.9 ANXIETY AND DEPRESSION: ICD-10-CM

## 2019-08-12 DIAGNOSIS — E78.2 MIXED HYPERLIPIDEMIA: ICD-10-CM

## 2019-08-12 DIAGNOSIS — Z99.89 OSA ON CPAP: Primary | Chronic | ICD-10-CM

## 2019-08-12 DIAGNOSIS — F32.A ANXIETY AND DEPRESSION: ICD-10-CM

## 2019-08-12 RX ORDER — ASPIRIN 81 MG/1
81 TABLET ORAL DAILY
COMMUNITY

## 2019-08-12 RX ORDER — ASCORBIC ACID 500 MG
TABLET ORAL DAILY
COMMUNITY

## 2019-08-12 NOTE — PROGRESS NOTES
Delfino Onofre is a 53 y.o. female      Room 4    Chief Complaint   Patient presents with    Blood Pressure Check    Cholesterol Problem    Follow-up     1. Have you been to the ER, urgent care clinic since your last visit? Hospitalized since your last visit? No    2. Have you seen or consulted any other health care providers outside of the [address] [place] [address] since your last visit? Include any pap smears or colon screening.  No

## 2019-08-12 NOTE — PATIENT INSTRUCTIONS
Learning About CPAP for Sleep Apnea  What is CPAP? CPAP is a small machine that you use at home every night while you sleep. It increases air pressure in your throat to keep your airway open. When you have sleep apnea, this can help you sleep better so you feel much better. CPAP stands for \"continuous positive airway pressure. \"  The CPAP machine will have one of the following:  · A mask that covers your nose and mouth  · Prongs that fit into your nose  · A mask that covers your nose only, the most common type. This type is called NCPAP. The N stands for \"nasal.\"  Why is it done? CPAP is usually the best treatment for obstructive sleep apnea. It is the first treatment choice and the most widely used. Your doctor may suggest CPAP if you have:  · Moderate to severe sleep apnea. · Sleep apnea and coronary artery disease (CAD). · Sleep apnea and heart failure. How does it help? · CPAP can help you have more normal sleep, so you feel less sleepy and more alert during the daytime. · CPAP may help keep heart failure or other heart problems from getting worse. · CPAP may help lower your blood pressure. · If you use CPAP, your bed partner may also sleep better because you are not snoring or restless. What are the side effects? Some people who use CPAP have:  · A dry or stuffy nose and a sore throat. · Irritated skin on the face. · Sore eyes. · Bloating. If you have any of these problems, work with your doctor to fix them. Here are some things you can try:  · Be sure the mask or nasal prongs fit well. · See if your doctor can adjust the pressure of your CPAP. · If your nose is dry, try a humidifier. · If your nose is runny or stuffy, try decongestant medicine or a steroid nasal spray. Be safe with medicines. Read and follow all instructions on the label. Do not use the medicine longer than the label says. If these things do not help, you might try a different type of machine.  Some machines have air pressure that adjusts on its own. Others have air pressures that are different when you breathe in than when you breathe out. This may reduce discomfort caused by too much pressure in your nose. Where can you learn more? Go to http://renetta-guadalupe.info/. Enter C446 in the search box to learn more about \"Learning About CPAP for Sleep Apnea. \"  Current as of: September 5, 2018  Content Version: 12.1  © 7142-2855 [employer]. Care instructions adapted under license by [employer] (which disclaims liability or warranty for this information). If you have questions about a medical condition or this instruction, always ask your healthcare professional. [address] any warranty or liability for your use of this information.

## 2020-02-26 ENCOUNTER — OFFICE VISIT (OUTPATIENT)
Dept: INTERNAL MEDICINE CLINIC | Age: 45
End: 2020-02-26

## 2020-02-26 VITALS
BODY MASS INDEX: 33.87 KG/M2 | HEART RATE: 75 BPM | WEIGHT: 198.4 LBS | SYSTOLIC BLOOD PRESSURE: 114 MMHG | OXYGEN SATURATION: 99 % | TEMPERATURE: 98.2 F | HEIGHT: 64 IN | RESPIRATION RATE: 14 BRPM | DIASTOLIC BLOOD PRESSURE: 75 MMHG

## 2020-02-26 DIAGNOSIS — F34.1 DYSTHYMIA: ICD-10-CM

## 2020-02-26 DIAGNOSIS — I49.9 CARDIAC ARRHYTHMIA, UNSPECIFIED CARDIAC ARRHYTHMIA TYPE: ICD-10-CM

## 2020-02-26 DIAGNOSIS — Z99.89 OSA ON CPAP: ICD-10-CM

## 2020-02-26 DIAGNOSIS — G47.33 OSA ON CPAP: ICD-10-CM

## 2020-02-26 DIAGNOSIS — K21.9 GASTROESOPHAGEAL REFLUX DISEASE, ESOPHAGITIS PRESENCE NOT SPECIFIED: ICD-10-CM

## 2020-02-26 DIAGNOSIS — E78.2 MIXED HYPERLIPIDEMIA: ICD-10-CM

## 2020-02-26 DIAGNOSIS — Z00.00 ROUTINE ADULT HEALTH MAINTENANCE: Primary | ICD-10-CM

## 2020-02-26 RX ORDER — PANTOPRAZOLE SODIUM 40 MG/1
40 TABLET, DELAYED RELEASE ORAL DAILY
Qty: 90 TAB | Refills: 3 | Status: SHIPPED | OUTPATIENT
Start: 2020-02-26 | End: 2020-05-26

## 2020-02-26 NOTE — PROGRESS NOTES
Thomas Contreras is a 53 y.o. female who presents for evaluation of cpe. Last seen by me aug 12, 2019. Struggles with seasonal affective disorder, so her depression is bit worse lately. Also been having increased reflux and gerd symptoms for past few months. Has improved some since cutting back on carbs. Struggling with her cpap as well. Weight is up 11 lbs since last visit.       ROS:  Constitutional: negative for fevers, chills, anorexia and weight loss  Eyes:   negative for visual disturbance and irritation  ENT:   negative for tinnitus,sore throat,nasal congestion,ear pain,hoarseness  Respiratory:  negative for cough, hemoptysis, dyspnea,wheezing  CV:   negative for chest pain, palpitations, lower extremity edema  GI:   negative for nausea, vomiting, diarrhea, abdominal pain,melena  Genitourinary: negative for frequency, dysuria and hematuria  Musculoskel: negative for myalgias, arthralgias, back pain, muscle weakness, joint pain  Neurological:  negative for headaches, dizziness, focal weakness, numbness  Psychiatric:     Negative for depression or anxiety      Past Medical History:   Diagnosis Date    Anxiety     Depression     Dr. De Luna Din    Hemorrhage 2002    postpartum (23 units transfused)    Hypercholesterolemia        Past Surgical History:   Procedure Laterality Date    HX ADENOIDECTOMY      +partial tonsillectomy    HX ADENOIDECTOMY      HX  SECTION  2002    HX DILATION AND CURETTAGE      HX HYSTERECTOMY  2002       Family History   Problem Relation Age of Onset    Cancer Mother         lung    COPD Mother     Emphysema Mother     Heart Disease Father     Heart Attack Father     No Known Problems Brother     Heart Attack Paternal Grandfather     Heart Disease Paternal Grandfather        Social History     Socioeconomic History    Marital status:      Spouse name: Not on file    Number of children: Not on file    Years of education: Not on file   [facility] Highest education level: Not on file   Occupational History    Not on file   Social Needs    Financial resource strain: Not on file    Food insecurity:     Worry: Not on file     Inability: Not on file    Transportation needs:     Medical: Not on file     Non-medical: Not on file   Tobacco Use    Smoking status: Former Smoker    Smokeless tobacco: Never Used   Substance and Sexual Activity    Alcohol use: Yes     Alcohol/week: 5.0 standard drinks     Types: 6 Cans of beer per week    Drug use: No    Sexual activity: Yes     Partners: Male   Lifestyle    Physical activity:     Days per week: Not on file     Minutes per session: Not on file    Stress: Not on file   Relationships    Social connections:     Talks on phone: Not on file     Gets together: Not on file     Attends Hoahaoism service: Not on file     Active member of club or organization: Not on file     Attends meetings of clubs or organizations: Not on file     Relationship status: Not on file    Intimate partner violence:     Fear of current or ex partner: Not on file     Emotionally abused: Not on file     Physically abused: Not on file     Forced sexual activity: Not on file   Other Topics Concern    Not on file   Social History Narrative    Not on file            Visit Vitals  /75 (BP 1 Location: Right arm, BP Patient Position: Sitting)   Pulse 75   Temp 98.2 °F (36.8 °C) (Oral)   Resp 14   Ht 5' 4\" (1.626 m)   Wt 198 lb 6.4 oz (90 kg)   SpO2 99%   BMI 34.06 kg/m²       Physical Examination:   General - Well appearing female  HEENT - PERRL, TM no erythema/opacification, normal nasal turbinates, no oropharyngeal erythema or exudate, MMM  Neck - supple, no bruits, no thyroidomegaly, no lymphadenopathy  Pulm - clear to auscultation bilaterally  Cardio - RRR, normal S1 S2, no murmur  Abd - soft, nontender, no masses, no HSM  Extrem - no edema, +2 distal pulses  Neuro-  No focal deficits, CN intact     Assessment/Plan:    1.   Routine adult health maintenance--check cbc, cmp, flp, tsh, a1c  2.  gerd with cough--rx for protonix  3.  perri--not tolerating her cpap well, advised her to see sleep team  4. Seasonal affective disorder with a/d--continue wellbutrin, trintellix, prn xanax. Referral to counselor, Kirti Wheatley. Her psychiatrist took new job, so she is without one now. 5.  Arrhythmia--remains nsr, on bystolic  6.  hyperlipids--check flp, on zetia and zocor  7. Sinus congestion--suggested flonase or nasocort    rtc 3 months, follow gerd.         Ramiro Herman III, DO

## 2020-02-26 NOTE — PATIENT INSTRUCTIONS
Office Policies    Phone calls/patient messages:            Please allow up to 24 hours for someone in the office to contact you about your call or message. Be mindful your provider may be out of the office or your message may require further review. We encourage you to use [employer] for your messages as this is a faster, more efficient way to communicate with our office                         Medication Refills:            Prescription medications require 48-72 business hours to process. We encourage you to use [employer] for your refills. For controlled medications: Please allow 72 business hours to process. Certain medications may require you to  a written prescription at our office. NO narcotic/controlled medications will be prescribed after 4pm Monday through Friday or on weekends              Form/Paperwork Completion:            Please note a $25 fee may incur for all paperwork for completed by our providers. We ask that you allow 7-10 business days. Pre-payment is due prior to picking up/faxing the completed form. You may also download your forms to [employer] to have your doctor print off. Gastroesophageal Reflux Disease (GERD): Care Instructions  Your Care Instructions    Gastroesophageal reflux disease (GERD) is the backward flow of stomach acid into the esophagus. The esophagus is the tube that leads from your throat to your stomach. A one-way valve prevents the stomach acid from moving up into this tube. When you have GERD, this valve does not close tightly enough. If you have mild GERD symptoms including heartburn, you may be able to control the problem with antacids or over-the-counter medicine. Changing your diet, losing weight, and making other lifestyle changes can also help reduce symptoms. Follow-up care is a key part of your treatment and safety. Be sure to make and go to all appointments, and call your doctor if you are having problems.  It's also a good idea to know your test results and keep a list of the medicines you take. How can you care for yourself at home? · Take your medicines exactly as prescribed. Call your doctor if you think you are having a problem with your medicine. · Your doctor may recommend over-the-counter medicine. For mild or occasional indigestion, antacids, such as Tums, Gaviscon, Mylanta, or Maalox, may help. Your doctor also may recommend over-the-counter acid reducers, such as Pepcid AC, Tagamet HB, Zantac 75, or Prilosec. Read and follow all instructions on the label. If you use these medicines often, talk with your doctor. · Change your eating habits. ? It's best to eat several small meals instead of two or three large meals. ? After you eat, wait 2 to 3 hours before you lie down. ? Chocolate, mint, and alcohol can make GERD worse. ? Spicy foods, foods that have a lot of acid (like tomatoes and oranges), and coffee can make GERD symptoms worse in some people. If your symptoms are worse after you eat a certain food, you may want to stop eating that food to see if your symptoms get better. · Do not smoke or chew tobacco. Smoking can make GERD worse. If you need help quitting, talk to your doctor about stop-smoking programs and medicines. These can increase your chances of quitting for good. · If you have GERD symptoms at night, raise the head of your bed 6 to 8 inches by putting the frame on blocks or placing a foam wedge under the head of your mattress. (Adding extra pillows does not work.)  · Do not wear tight clothing around your middle. · Lose weight if you need to. Losing just 5 to 10 pounds can help. When should you call for help?   Call your doctor now or seek immediate medical care if:    · You have new or different belly pain.     · Your stools are black and tarlike or have streaks of blood.    Watch closely for changes in your health, and be sure to contact your doctor if:    · Your symptoms have not improved after 2 days.     · Food seems to catch in your throat or chest.   Where can you learn more? Go to http://renetta-guadalupe.info/. Enter M272 in the search box to learn more about \"Gastroesophageal Reflux Disease (GERD): Care Instructions. \"  Current as of: November 7, 2018  Content Version: 12.2  © 5635-0430 [employer]. Care instructions adapted under license by [employer] (which disclaims liability or warranty for this information). If you have questions about a medical condition or this instruction, always ask your healthcare professional. [address] any warranty or liability for your use of this information. Would try flonase or nasocort to help with sinus congestion.   Make an appt with sleep team.

## 2020-02-26 NOTE — PROGRESS NOTES
Reviewed record in preparation for visit and have obtained necessary documentation. Identified pt with two pt identifiers(name and ). Chief Complaint   Patient presents with    Anxiety    Depression    Cholesterol Problem       Health Maintenance Due   Topic Date Due    Influenza Age 5 to Adult  2019    Lipid Screen  2020       Ms. Christie Gallagher has a reminder for a \"due or due soon\" health maintenance. I have asked that she discuss health maintenance topic(s) due with Her  primary care provider. Coordination of Care Questionnaire:  :     1) Have you been to an emergency room, urgent care clinic since your last visit? no   Hospitalized since your last visit? no             2) Have you seen or consulted any other health care providers outside of [address] [place] [address] since your last visit? no  (Include any pap smears or colon screenings in this section.)    3) Do you have an Advance Directive on file? yes    4) Are you interested in receiving information on Advance Directives? NO    Patient is accompanied by self I have received verbal consent from Dev Arana to discuss any/all medical information while they are present in the room.

## 2020-02-27 LAB
ALBUMIN SERPL-MCNC: 4.6 G/DL (ref 3.8–4.8)
ALBUMIN/GLOB SERPL: 1.9 {RATIO} (ref 1.2–2.2)
ALP SERPL-CCNC: 44 IU/L (ref 39–117)
ALT SERPL-CCNC: 31 IU/L (ref 0–32)
AST SERPL-CCNC: 24 IU/L (ref 0–40)
BASOPHILS # BLD AUTO: 0.1 X10E3/UL (ref 0–0.2)
BASOPHILS NFR BLD AUTO: 1 %
BILIRUB SERPL-MCNC: 0.3 MG/DL (ref 0–1.2)
BUN SERPL-MCNC: 14 MG/DL (ref 6–24)
BUN/CREAT SERPL: 13 (ref 9–23)
CALCIUM SERPL-MCNC: 9.8 MG/DL (ref 8.7–10.2)
CHLORIDE SERPL-SCNC: 103 MMOL/L (ref 96–106)
CHOLEST SERPL-MCNC: 187 MG/DL (ref 100–199)
CO2 SERPL-SCNC: 23 MMOL/L (ref 20–29)
CREAT SERPL-MCNC: 1.07 MG/DL (ref 0.57–1)
EOSINOPHIL # BLD AUTO: 0.2 X10E3/UL (ref 0–0.4)
EOSINOPHIL NFR BLD AUTO: 3 %
ERYTHROCYTE [DISTWIDTH] IN BLOOD BY AUTOMATED COUNT: 12 % (ref 11.7–15.4)
EST. AVERAGE GLUCOSE BLD GHB EST-MCNC: 97 MG/DL
GLOBULIN SER CALC-MCNC: 2.4 G/DL (ref 1.5–4.5)
GLUCOSE SERPL-MCNC: 91 MG/DL (ref 65–99)
HBA1C MFR BLD: 5 % (ref 4.8–5.6)
HCT VFR BLD AUTO: 40.1 % (ref 34–46.6)
HDLC SERPL-MCNC: 62 MG/DL
HGB BLD-MCNC: 13.6 G/DL (ref 11.1–15.9)
IMM GRANULOCYTES # BLD AUTO: 0 X10E3/UL (ref 0–0.1)
IMM GRANULOCYTES NFR BLD AUTO: 1 %
LDLC SERPL CALC-MCNC: 94 MG/DL (ref 0–99)
LYMPHOCYTES # BLD AUTO: 2 X10E3/UL (ref 0.7–3.1)
LYMPHOCYTES NFR BLD AUTO: 28 %
MCH RBC QN AUTO: 31.5 PG (ref 26.6–33)
MCHC RBC AUTO-ENTMCNC: 33.9 G/DL (ref 31.5–35.7)
MCV RBC AUTO: 93 FL (ref 79–97)
MONOCYTES # BLD AUTO: 0.6 X10E3/UL (ref 0.1–0.9)
MONOCYTES NFR BLD AUTO: 9 %
NEUTROPHILS # BLD AUTO: 4.1 X10E3/UL (ref 1.4–7)
NEUTROPHILS NFR BLD AUTO: 58 %
PLATELET # BLD AUTO: 251 X10E3/UL (ref 150–450)
POTASSIUM SERPL-SCNC: 5.5 MMOL/L (ref 3.5–5.2)
PROT SERPL-MCNC: 7 G/DL (ref 6–8.5)
RBC # BLD AUTO: 4.32 X10E6/UL (ref 3.77–5.28)
SODIUM SERPL-SCNC: 141 MMOL/L (ref 134–144)
TRIGL SERPL-MCNC: 155 MG/DL (ref 0–149)
TSH SERPL DL<=0.005 MIU/L-ACNC: 2.75 UIU/ML (ref 0.45–4.5)
VLDLC SERPL CALC-MCNC: 31 MG/DL (ref 5–40)
WBC # BLD AUTO: 6.9 X10E3/UL (ref 3.4–10.8)

## 2020-02-27 NOTE — PROGRESS NOTES
Labs look good. Nothing to suggest an ulcer or any other gi issue. Work on weight loss, and make an appt with sleep team to get cpap fixed.

## 2020-03-20 DIAGNOSIS — F32.A ANXIETY AND DEPRESSION: ICD-10-CM

## 2020-03-20 DIAGNOSIS — F41.9 ANXIETY AND DEPRESSION: ICD-10-CM

## 2020-03-20 RX ORDER — ALPRAZOLAM 0.25 MG/1
0.25 TABLET ORAL
Qty: 60 TAB | Refills: 1 | Status: SHIPPED | OUTPATIENT
Start: 2020-03-20 | End: 2020-09-29

## 2020-05-26 ENCOUNTER — VIRTUAL VISIT (OUTPATIENT)
Dept: INTERNAL MEDICINE CLINIC | Age: 45
End: 2020-05-26

## 2020-05-26 DIAGNOSIS — F32.A ANXIETY AND DEPRESSION: Primary | ICD-10-CM

## 2020-05-26 DIAGNOSIS — I49.9 CARDIAC ARRHYTHMIA, UNSPECIFIED CARDIAC ARRHYTHMIA TYPE: ICD-10-CM

## 2020-05-26 DIAGNOSIS — Z99.89 OSA ON CPAP: ICD-10-CM

## 2020-05-26 DIAGNOSIS — E66.9 OBESITY (BMI 30-39.9): ICD-10-CM

## 2020-05-26 DIAGNOSIS — E78.2 MIXED HYPERLIPIDEMIA: ICD-10-CM

## 2020-05-26 DIAGNOSIS — G47.33 OSA ON CPAP: ICD-10-CM

## 2020-05-26 DIAGNOSIS — K21.9 GASTROESOPHAGEAL REFLUX DISEASE, ESOPHAGITIS PRESENCE NOT SPECIFIED: ICD-10-CM

## 2020-05-26 DIAGNOSIS — F41.9 ANXIETY AND DEPRESSION: Primary | ICD-10-CM

## 2020-05-26 RX ORDER — ESOMEPRAZOLE MAGNESIUM 40 MG/1
CAPSULE, DELAYED RELEASE ORAL
COMMUNITY
Start: 2020-05-21 | End: 2020-11-23

## 2020-05-26 NOTE — PROGRESS NOTES
Guero Lenz is a 53 y.o. female who was seen by synchronous (real-time) audio-video technology on 5/26/2020. Consent: Guero Lenz, who was seen by synchronous (real-time) audio-video technology, and/or her healthcare decision maker, is aware that this patient-initiated, Telehealth encounter on 5/26/2020 is a billable service, with coverage as determined by her insurance carrier. She is aware that she may receive a bill and has provided verbal consent to proceed: Yes. Assessment & Plan:   Diagnoses and all orders for this visit:    1. Anxiety and depression    2. Mixed hyperlipidemia    3. Cardiac arrhythmia, unspecified cardiac arrhythmia type    4. Gastroesophageal reflux disease, esophagitis presence not specified    5. Obesity (BMI 30-39.9)    6. ALONA on CPAP          I spent at least 23 minutes on this visit with this established patient. [phone]  Subjective:   Guero Lenz is a 53 y.o. female who was seen for Follow-up    Last seen by me feb 26, 2020 in [facility]. Switched her to protonix for her reflux, but it did not help. She then saw dr Darryle Lobos, had reich clip inserted, and her acid levels were very high. She has an appt later this week with surgeon dr Ana Reese at [facility] [place] [facility] to discuss henny procedure. Had a spell recently when she was using her xanax more often, but has since moved past that need. She wears her cpap typically only a few nights per week, asked her to try to use it every night. This is a virtual visit due to covid 19. Prior to Admission medications    Medication Sig Start Date End Date Taking? Authorizing Provider   esomeprazole (NEXIUM) 40 mg capsule TAKE 1 CAPSULE BY MOUTH EVERY MORNING 5/21/20  Yes Provider, Historical   Trintellix 20 mg tablet TAKE 1 TABLET BY MOUTH DAILY. 4/8/20  Yes Yusef Roque III, DO   ALPRAZolam (XANAX) 0.25 mg tablet Take 1 Tab by mouth two (2) times daily as needed for Anxiety.  3/20/20  Yes Sun Bar III, DO BYSTOLIC 10 mg tablet TAKE 1 TABLET BY MOUTH EVERY DAY 3/8/20  Yes Yusef Roque III, DO   pantoprazole (PROTONIX) 40 mg tablet Take 1 Tab by mouth daily. 2/26/20  Yes Yusef Roque III, DO   ezetimibe (ZETIA) 10 mg tablet TAKE 1 TABLET BY MOUTH EVERY DAY 1/8/20  Yes Yusef Roque III, DO   ascorbic acid, vitamin C, (VITAMIN C) 500 mg tablet Take  by mouth daily. Yes Provider, Historical   aspirin delayed-release 81 mg tablet Take 81 mg by mouth daily. Yes Provider, Historical   valACYclovir (VALTREX) 1 gram tablet TAKE 1 TAB BY MOUTH EVERY TWELVE (12) HOURS AS NEEDED. 7/7/19  Yes Yusef Roque III, DO   simvastatin (ZOCOR) 40 mg tablet Take 1 Tab by mouth nightly. 6/11/19  Yes Yusef Roque III, DO   buPROPion XL (WELLBUTRIN XL) 150 mg tablet TAKE 3 TABS BY MOUTH EVERY MORNING. 6/4/19  Yes Yusef Roque III, DO   multivitamin (ONE A DAY) tablet Take 1 Tab by mouth daily. Yes Provider, Historical   Omega-3 Fatty Acids (FISH OIL) 500 mg cap Take  by mouth daily. Provider, Historical     No Known Allergies        ROS      Objective: There were no vitals taken for this visit. General: alert, cooperative, no distress   Mental  status: normal mood, behavior, speech, dress, motor activity, and thought processes, able to follow commands   HENT: NCAT   Neck: no visualized mass   Resp: no respiratory distress   Neuro: no gross deficits   Skin: no discoloration or lesions of concern on visible areas   Psychiatric: normal affect, consistent with stated mood, no evidence of hallucinations     Additional exam findings:     1. Severe gerd--on nexium. Has appt with surgery, dr Otis Heath, at [facility] [place] [facility] to discuss sx intervention. 2.  perri--encouraged to wear cpap nightly  3. Anxiety and depression--referral again to marcia degroot. Continue trintellix, wellbutrin, prn xanax  4.  hyperlipids--on zetia and zocor  5. Arrhythmia--on bystolic    rtc 6 months.     We discussed the expected course, resolution and complications of the diagnosis(es) in detail. Medication risks, benefits, costs, interactions, and alternatives were discussed as indicated. I advised her to contact the office if her condition worsens, changes or fails to improve as anticipated. She expressed understanding with the diagnosis(es) and plan. Dominique Carey is a 53 y.o. female who was evaluated by a video visit encounter for concerns as above. Patient identification was verified prior to start of the visit. A caregiver was present when appropriate. Due to this being a TeleHealth encounter (During ZDWER-26 public health emergency), evaluation of the following organ systems was limited: Vitals/Constitutional/EENT/Resp/CV/GI//MS/Neuro/Skin/Heme-Lymph-Imm. Pursuant to the emergency declaration under the [facility], 1135 waiver authority and the [employer] Act, this Virtual  Visit was conducted, with patient's (and/or legal guardian's) consent, to reduce the patient's risk of exposure to COVID-19 and provide necessary medical care. Services were provided through a video synchronous discussion virtually to substitute for in-person clinic visit. Patient and provider were located at their individual homes.       Abhijit Berkowitz III,

## 2020-11-09 ENCOUNTER — TELEPHONE (OUTPATIENT)
Dept: INTERNAL MEDICINE CLINIC | Age: 45
End: 2020-11-09

## 2020-11-09 NOTE — TELEPHONE ENCOUNTER
#[phone]  Pt states she has an appt on 11-23-20 and would like to be seen sooner due to a metallic taste in her mouth. Please call to reschedule.

## 2020-11-23 ENCOUNTER — OFFICE VISIT (OUTPATIENT)
Dept: INTERNAL MEDICINE CLINIC | Age: 45
End: 2020-11-23
Payer: COMMERCIAL

## 2020-11-23 VITALS
DIASTOLIC BLOOD PRESSURE: 76 MMHG | RESPIRATION RATE: 16 BRPM | TEMPERATURE: 96.4 F | OXYGEN SATURATION: 97 % | WEIGHT: 184.8 LBS | SYSTOLIC BLOOD PRESSURE: 112 MMHG | HEART RATE: 77 BPM | HEIGHT: 64 IN | BODY MASS INDEX: 31.55 KG/M2

## 2020-11-23 DIAGNOSIS — E78.2 MIXED HYPERLIPIDEMIA: ICD-10-CM

## 2020-11-23 DIAGNOSIS — Z87.19 HISTORY OF REPAIR OF HIATAL HERNIA: ICD-10-CM

## 2020-11-23 DIAGNOSIS — F32.A ANXIETY AND DEPRESSION: Primary | ICD-10-CM

## 2020-11-23 DIAGNOSIS — I49.9 CARDIAC ARRHYTHMIA, UNSPECIFIED CARDIAC ARRHYTHMIA TYPE: ICD-10-CM

## 2020-11-23 DIAGNOSIS — G47.30 SLEEP APNEA, UNSPECIFIED TYPE: ICD-10-CM

## 2020-11-23 DIAGNOSIS — Z98.890 HISTORY OF REPAIR OF HIATAL HERNIA: ICD-10-CM

## 2020-11-23 DIAGNOSIS — E66.9 OBESITY (BMI 30-39.9): ICD-10-CM

## 2020-11-23 DIAGNOSIS — F41.9 ANXIETY AND DEPRESSION: Primary | ICD-10-CM

## 2020-11-23 PROCEDURE — 99213 OFFICE O/P EST LOW 20 MIN: CPT | Performed by: INTERNAL MEDICINE

## 2020-11-23 NOTE — PROGRESS NOTES
Re Wilson is a 50 y.o. female who presents for evaluation of routine follow up. Last seen by me may 26, 2020 in virtual visit. Since then she had her LINX procedure done to help her hiatal hernia, and it has helped nicely. No longer uses nexium, and no longer has gerd. On occasion, maybe few times per month, she gets some nausea, but that resolves with zofran. Weight down 14 lbs since feb.       ROS:  Constitutional: negative for fevers, chills, anorexia and weight loss  Eyes:   negative for visual disturbance and irritation  ENT:   negative for tinnitus,sore throat,nasal congestion,ear pain,hoarseness  Respiratory:  negative for cough, hemoptysis, dyspnea,wheezing  CV:   negative for chest pain, palpitations, lower extremity edema  GI:   negative for nausea, vomiting, diarrhea, abdominal pain,melena  Genitourinary: negative for frequency, dysuria and hematuria  Musculoskel: negative for myalgias, arthralgias, back pain, muscle weakness, joint pain  Neurological:  negative for headaches, dizziness, focal weakness, numbness  Psychiatric:     Negative for depression or anxiety      Past Medical History:   Diagnosis Date    Anxiety     Depression     Dr. Sarina Jones    GERD (gastroesophageal reflux disease)     Hemorrhage 2002    postpartum (23 units transfused)    Hypercholesterolemia        Past Surgical History:   Procedure Laterality Date    HX ADENOIDECTOMY      +partial tonsillectomy    HX ADENOIDECTOMY      HX  SECTION  2002    HX DILATION AND CURETTAGE      HX HERNIA REPAIR  2020    HX HYSTERECTOMY  2002       Family History   Problem Relation Age of Onset    Cancer Mother         lung    COPD Mother     Emphysema Mother     Heart Disease Father     Heart Attack Father     No Known Problems Brother     Heart Attack Paternal Grandfather     Heart Disease Paternal Grandfather        Social History     Socioeconomic History    Marital status:      Spouse name: Not on file    Number of children: Not on file    Years of education: Not on file    Highest education level: Not on file   Occupational History    Not on file   Social Needs    Financial resource strain: Not on file    Food insecurity     Worry: Not on file     Inability: Not on file    Transportation needs     Medical: Not on file     Non-medical: Not on file   Tobacco Use    Smoking status: Former Smoker    Smokeless tobacco: Never Used   Substance and Sexual Activity    Alcohol use:  Yes     Alcohol/week: 5.0 standard drinks     Types: 6 Cans of beer per week    Drug use: No    Sexual activity: Yes     Partners: Male   Lifestyle    Physical activity     Days per week: Not on file     Minutes per session: Not on file    Stress: Not on file   Relationships    Social connections     Talks on phone: Not on file     Gets together: Not on file     Attends Judaism service: Not on file     Active member of club or organization: Not on file     Attends meetings of clubs or organizations: Not on file     Relationship status: Not on file    Intimate partner violence     Fear of current or ex partner: Not on file     Emotionally abused: Not on file     Physically abused: Not on file     Forced sexual activity: Not on file   Other Topics Concern    Not on file   Social History Narrative    Not on file            Visit Vitals  /76 (BP 1 Location: Left arm, BP Patient Position: Sitting)   Pulse 77   Temp (!) 96.4 °F (35.8 °C) (Temporal)   Resp 16   Ht 5' 4\" (1.626 m)   Wt 184 lb 12.8 oz (83.8 kg)   SpO2 97%   BMI 31.72 kg/m²       Physical Examination:   General - Well appearing female  HEENT - PERRL, TM no erythema/opacification, normal nasal turbinates, no oropharyngeal erythema or exudate, MMM  Neck - supple, no bruits, no thyroidomegaly, no lymphadenopathy  Pulm - clear to auscultation bilaterally  Cardio - RRR, normal S1 S2, no murmur  Abd - soft, nontender, no masses, no HSM  Extrem - no edema, +2 distal pulses  Neuro-  No focal deficits, CN intact     Assessment/Plan:    1. ESTEFANIA--doing well with wellbutrin and trintellix. Rarely needs xanax for this (she does take it to help with sleep on occasion)  2.  perri--no longer needs cpap  3.  hyperlipids--on zetia, zocor  4. Arrhythmia--maintains nsr with bystolic  5. Hiatal hernia--sp LINX procedure, much improved  6. Occasional nausea--responds well to zofran. If persists or becomes more regular, will have her see gi again. Declines flu shot. rtc 6 months for 30 min cpe.         Stephy Tipton III, DO

## 2020-11-23 NOTE — PATIENT INSTRUCTIONS
Recovering From Depression: Care Instructions Your Care Instructions Taking good care of yourself is important as you recover from depression. In time, your symptoms will fade as your treatment takes hold. Do not give up. Instead, focus your energy on getting better. Your mood will improve. It just takes some time. Focus on things that can help you feel better, such as being with friends and family, eating well, and getting enough rest. But take things slowly. Do not do too much too soon. You will begin to feel better gradually. Follow-up care is a key part of your treatment and safety. Be sure to make and go to all appointments, and call your doctor if you are having problems. It's also a good idea to know your test results and keep a list of the medicines you take. How can you care for yourself at home? Be realistic · If you have a large task to do, break it up into smaller steps you can handle, and just do what you can. · You may want to put off important decisions until your depression has lifted. If you have plans that will have a major impact on your life, such as marriage, divorce, or a job change, try to wait a bit. Talk it over with friends and loved ones who can help you look at the overall picture first. 
· Reaching out to people for help is important. Do not isolate yourself. Let your family and friends help you. Find someone you can trust and confide in, and talk to that person. · Be patient, and be kind to yourself. Remember that depression is not your fault and is not something you can overcome with willpower alone. Treatment is important for depression, just like for any other illness. Feeling better takes time, and your mood will improve little by little. Stay active · Stay busy and get outside. Take a walk, or try some other light exercise. · Talk with your doctor about an exercise program. Exercise can help with mild depression. · Go to a movie or concert. Take part in a Christian activity or other social gathering. Go to a ball game. · Ask a friend to have dinner with you. Take care of yourself · Eat a balanced diet with plenty of fresh fruits and vegetables, whole grains, and lean protein. If you have lost your appetite, eat small snacks rather than large meals. · Avoid using illegal drugs or marijuana and drinking alcohol. Do not take medicines that have not been prescribed for you. They may interfere with medicines you may be taking for depression, or they may make your depression worse. · Take your medicines exactly as they are prescribed. You may start to feel better within 1 to 3 weeks of taking antidepressant medicine. But it can take as many as 6 to 8 weeks to see more improvement. If you have questions or concerns about your medicines, or if you do not notice any improvement by 3 weeks, talk to your doctor. · Continue to take your medicine after your symptoms improve. Taking your medicine for at least 6 months after you feel better can help keep you from getting depressed again. If this isn't the first time you have been depressed, your doctor may recommend you to take medicine even longer. · If you have any side effects from your medicine, tell your doctor. Many side effects are mild and will go away on their own after you have been taking the medicine for a few weeks. Some may last longer. Talk to your doctor if side effects are bothering you too much. You might be able to try a different medicine. · Continue counseling. It may help prevent depression from returning, especially if you've had multiple episodes of depression. Talk with your counselor if you are having a hard time attending your sessions or you think the sessions aren't working. Don't just stop going. · Get enough sleep. Talk to your doctor if you are having problems sleeping.  
· Avoid sleeping pills unless they are prescribed by the doctor treating your depression. Sleeping pills may make you groggy during the day, and they may interact with other medicine you are taking. · If you have any other illnesses, such as diabetes, heart disease, or high blood pressure, make sure to continue with your treatment. Tell your doctor about all of the medicines you take, including those with or without a prescription. · If you or someone you know talks about suicide, self-harm, or feeling hopeless, get help right away. Call the [facility] at 1-800-273-talk (0-916.537.4593) or text HOME to 347449 to access the Crisis Text Line. Consider saving these numbers in your phone. When should you call for help? Call 911 anytime you think you may need emergency care. For example, call if: 
  · You feel like hurting yourself or someone else.  
  · Someone you know has depression and is about to attempt or is attempting suicide. Call your doctor now or seek immediate medical care if: 
  · You hear voices.  
  · Someone you know has depression and: 
? Starts to give away his or her possessions. ? Uses illegal drugs or drinks alcohol heavily. ? Talks or writes about death, including writing suicide notes or talking about guns, knives, or pills. ? Starts to spend a lot of time alone. ? Acts very aggressively or suddenly appears calm. Watch closely for changes in your health, and be sure to contact your doctor if: 
  · You do not get better as expected. Where can you learn more? Go to http://www.gray.com/ Enter Z010 in the search box to learn more about \"Recovering From Depression: Care Instructions. \" Current as of: January 31, 2020               Content Version: 12.6 © 0281-0897 [employer]. Care instructions adapted under license by [employer] (which disclaims liability or warranty for this information).  If you have questions about a medical condition or this instruction, always ask your healthcare professional. [place] [address] any warranty or liability for your use of this information. Keep up the good work with weight loss. Can try ginger ale or ginger root to help with nausea spells.

## 2020-12-18 RX ORDER — EZETIMIBE 10 MG/1
TABLET ORAL
Qty: 90 TAB | Refills: 3 | Status: SHIPPED | OUTPATIENT
Start: 2020-12-18 | End: 2022-01-27

## 2021-03-08 RX ORDER — NEBIVOLOL HYDROCHLORIDE 10 MG/1
TABLET ORAL
Qty: 90 TAB | Refills: 3 | Status: SHIPPED | OUTPATIENT
Start: 2021-03-08 | End: 2022-03-25

## 2021-03-14 RX ORDER — VORTIOXETINE 20 MG/1
TABLET, FILM COATED ORAL
Qty: 90 TAB | Refills: 3 | Status: SHIPPED | OUTPATIENT
Start: 2021-03-14 | End: 2022-05-02

## 2021-05-24 ENCOUNTER — OFFICE VISIT (OUTPATIENT)
Dept: INTERNAL MEDICINE CLINIC | Age: 46
End: 2021-05-24
Payer: COMMERCIAL

## 2021-05-24 VITALS
WEIGHT: 175.8 LBS | HEART RATE: 73 BPM | OXYGEN SATURATION: 99 % | HEIGHT: 64 IN | SYSTOLIC BLOOD PRESSURE: 113 MMHG | BODY MASS INDEX: 30.01 KG/M2 | TEMPERATURE: 97.3 F | RESPIRATION RATE: 14 BRPM | DIASTOLIC BLOOD PRESSURE: 76 MMHG

## 2021-05-24 DIAGNOSIS — F41.9 ANXIETY AND DEPRESSION: ICD-10-CM

## 2021-05-24 DIAGNOSIS — E66.9 OBESITY (BMI 30-39.9): ICD-10-CM

## 2021-05-24 DIAGNOSIS — G47.30 SLEEP APNEA, UNSPECIFIED TYPE: ICD-10-CM

## 2021-05-24 DIAGNOSIS — K44.9 HIATAL HERNIA: ICD-10-CM

## 2021-05-24 DIAGNOSIS — F32.A ANXIETY AND DEPRESSION: ICD-10-CM

## 2021-05-24 DIAGNOSIS — E78.2 MIXED HYPERLIPIDEMIA: ICD-10-CM

## 2021-05-24 DIAGNOSIS — I49.9 CARDIAC ARRHYTHMIA, UNSPECIFIED CARDIAC ARRHYTHMIA TYPE: ICD-10-CM

## 2021-05-24 DIAGNOSIS — Z00.00 ANNUAL PHYSICAL EXAM: Primary | ICD-10-CM

## 2021-05-24 PROCEDURE — 99396 PREV VISIT EST AGE 40-64: CPT | Performed by: INTERNAL MEDICINE

## 2021-05-24 RX ORDER — FERROUS SULFATE, DRIED 160(50) MG
1 TABLET, EXTENDED RELEASE ORAL DAILY
COMMUNITY

## 2021-05-24 RX ORDER — ONDANSETRON 4 MG/1
4 TABLET, FILM COATED ORAL AS NEEDED
COMMUNITY
Start: 2020-08-17 | End: 2021-08-17 | Stop reason: SDUPTHER

## 2021-05-24 NOTE — PATIENT INSTRUCTIONS
Cardiac Arrhythmia: Care Instructions Your Care Instructions A cardiac arrhythmia is a change in the normal rhythm of the heart. Your heart may beat too fast or too slow or beat with an irregular or skipping rhythm. A change in the heart's rhythm may feel like a really strong heartbeat or a fluttering in your chest. A severe heart rhythm problem can keep the body from getting the blood it needs. This can result in shortness of breath, lightheadedness, and fainting. You may take medicine to treat your condition. Your doctor may recommend a pacemaker or recommend catheter ablation to destroy small parts of the heart that are causing a rhythm problem. Another possible treatment is an implantable cardioverter-defibrillator (ICD). An ICD is a device that gives the heart a shock to return the heart to a normal rhythm. Follow-up care is a key part of your treatment and safety. Be sure to make and go to all appointments, and call your doctor if you are having problems. It's also a good idea to know your test results and keep a list of the medicines you take. How can you care for yourself at home? General care 
  · Be safe with medicines. Take your medicines exactly as prescribed. Call your doctor if you think you are having a problem with your medicine. You will get more details on the specific medicines your doctor prescribes.  
  · If you received a pacemaker or an ICD, you will get a fact sheet about it.  
  · Wear medical alert jewelry that says you have an abnormal heart rhythm. You can buy this at most drugstores. Lifestyle changes 
  · Eat a heart-healthy diet.  
  · Stay at a healthy weight. Lose weight if you need to.  
  · Avoid nicotine, too much alcohol, and illegal drugs (meth, speed, and cocaine). Also, get enough sleep and do not overeat.  
  · Ask your doctor whether you can take over-the-counter medicines (such as decongestants).  These can make your heart beat fast.  
  · Talk to your doctor about any limits to activities, such as driving, or tasks where you use power tools or ladders. Activity 
  · Start light exercise if your doctor says you can. Even a small amount will help you get stronger, have more energy, and manage your stress.  
  · Get regular exercise. Try for 30 minutes on most days of the week. Ask your doctor what level of exercise is safe for you. If activity is not likely to cause health problems, you probably do not have limits on the type or level of activity that you can do. You may want to walk, swim, bike, or do other activities.  
  · When you exercise, watch for signs that your heart is working too hard. You are pushing too hard if you cannot talk while you exercise. If you become short of breath or dizzy or have chest pain, sit down and rest.  
  · Check your pulse daily. Place two fingers on the artery at the palm side of your wrist, in line with your thumb. If your heartbeat seems uneven, talk to your doctor. When should you call for help? Call 911 anytime you think you may need emergency care. For example, call if: 
  · You have symptoms of sudden heart failure. These may include: 
? Severe trouble breathing. ? A fast or irregular heartbeat. ? Coughing up pink, foamy mucus. ? You passed out.  
  · You have signs of a stroke. These include: 
? Sudden numbness, paralysis, or weakness in your face, arm, or leg, especially on only one side of your body. ? New problems with walking or balance. ? Sudden vision changes. ? Drooling or slurred speech. ? New problems speaking or understanding simple statements, or feeling confused. ? A sudden, severe headache that is different from past headaches. Call your doctor now or seek immediate medical care if: 
  · You have new or changed symptoms of heart failure, such as: ? New or increased shortness of breath. ? New or worse swelling in your legs, ankles, or feet.  
? Sudden weight gain, such as more than 2 to 3 pounds in a day or 5 pounds in a week. (Your doctor may suggest a different range of weight gain.) ? Feeling dizzy or lightheaded or like you may faint. ? Feeling so tired or weak that you cannot do your usual activities. ? Not sleeping well. Shortness of breath wakes you at night. You need extra pillows to prop yourself up to breathe easier. Watch closely for changes in your health, and be sure to contact your doctor if: 
  · You do not get better as expected. Where can you learn more? Go to http://www.gray.com/ Enter H264 in the search box to learn more about \"Cardiac Arrhythmia: Care Instructions. \" Current as of: August 31, 2020               Content Version: 12.8 © 7077-0190 [employer]. Care instructions adapted under license by [employer] (which disclaims liability or warranty for this information). If you have questions about a medical condition or this instruction, always ask your healthcare professional. [place] [address] any warranty or liability for your use of this information. Get fasting labs done. Nothing to eat after MN, but may drink water. Cut dose of bystolic in half for next 4 weeks, and if doing well, then stop and see how you do.

## 2021-05-24 NOTE — PROGRESS NOTES
Chapito Mcwilliams is a 50 y.o. female who presents for evaluation of cpe. Last seen by me 2020. Doing well. Weight down another 9 lbs. Reflux from hiatal hernia much improved since had LINX procedure done last year. Has quite a few mosquito bites. ROS:  Constitutional: negative for fevers, chills, anorexia and weight loss  Eyes:   negative for visual disturbance and irritation  ENT:   negative for tinnitus,sore throat,nasal congestion,ear pain,hoarseness  Respiratory:  negative for cough, hemoptysis, dyspnea,wheezing  CV:   negative for chest pain, palpitations, lower extremity edema  GI:   negative for nausea, vomiting, diarrhea, abdominal pain,melena  Genitourinary: negative for frequency, dysuria and hematuria  Musculoskel: negative for myalgias, arthralgias, back pain, muscle weakness, joint pain  Neurological:  negative for headaches, dizziness, focal weakness, numbness  Psychiatric:     Negative for depression or anxiety--well controlled. Rarely needs xanax.       Past Medical History:   Diagnosis Date    Anxiety     Depression     Dr. Pan Pereira    GERD (gastroesophageal reflux disease)     Hemorrhage 2002    postpartum (23 units transfused)    Hypercholesterolemia        Past Surgical History:   Procedure Laterality Date    HX ADENOIDECTOMY      +partial tonsillectomy    HX ADENOIDECTOMY      HX  SECTION  2002    HX DILATION AND CURETTAGE      HX HERNIA REPAIR  2020    HX HYSTERECTOMY  2002       Family History   Problem Relation Age of Onset    Cancer Mother         lung    COPD Mother     Emphysema Mother     Heart Disease Father     Heart Attack Father     No Known Problems Brother     Heart Attack Paternal Grandfather     Heart Disease Paternal Grandfather        Social History     Socioeconomic History    Marital status:      Spouse name: Not on file    Number of children: Not on file    Years of education: Not on file   [facility] education level: Not on file   Occupational History    Not on file   Tobacco Use    Smoking status: Former Smoker    Smokeless tobacco: Never Used   Substance and Sexual Activity    Alcohol use: Yes     Alcohol/week: 5.0 standard drinks     Types: 6 Cans of beer per week    Drug use: No    Sexual activity: Yes     Partners: Male   Other Topics Concern    Not on file   Social History Narrative    Not on file     Social Determinants of Health     Financial Resource Strain:     Difficulty of Paying Living Expenses:    Food Insecurity:     Worried About Running Out of Food in the Last Year:     [address] in the Last Year:    Transportation Needs:     Lack of Transportation (Medical):      Lack of Transportation (Non-Medical):    Physical Activity:     Days of Exercise per Week:     Minutes of Exercise per Session:    Stress:     Feeling of Stress :    Social Connections:     Frequency of Communication with Friends and Family:     Frequency of Social Gatherings with Friends and Family:     Attends Pent[employer]ism Services:     Active Member of Clubs or Organizations:     Attends Club or Organization Meetings:     Marital Status:    Intimate Partner Violence:     Fear of Current or Ex-Partner:     Emotionally Abused:     Physically Abused:     Sexually Abused:             Visit Vitals  /76 (BP 1 Location: Left upper arm, BP Patient Position: Sitting)   Pulse 73   Temp 97.3 °F (36.3 °C) (Temporal)   Resp 14   Ht 5' 4\" (1.626 m)   Wt 175 lb 12.8 oz (79.7 kg)   SpO2 99%   BMI 30.18 kg/m²       Physical Examination:   General - Well appearing female  HEENT - PERRL, TM no erythema/opacification, normal nasal turbinates, no oropharyngeal erythema or exudate, MMM  Neck - supple, no bruits, no thyroidomegaly, no lymphadenopathy  Pulm - clear to auscultation bilaterally  Cardio - RRR, normal S1 S2, no murmur  Abd - soft, nontender, no masses, no HSM  Extrem - no edema, +2 distal pulses  Neuro-  No focal deficits, CN intact     Assessment/Plan:    1. Annual cpe--check cbc, cmp, flp, tsh, a1c, hcv  2. ESTEFANIA--doing well with wellbutrin and trintellix. Rarely needs xanax  3.  perri--no longer uses cpap  4. Arrhythmia--no concerns. Wants to try to wean off bystolic. Decrease dose to 5 mg daily x 4 weeks, then stop. 5.  Hx hiatal hernia--sp LINX, doing well. 6.  hyperlipids--on zocor, zetia. Check flp, cmp    rtc one year, sooner if labs abn.         Petey De La Paz III, DO

## 2021-06-08 RX ORDER — SIMVASTATIN 40 MG/1
TABLET, FILM COATED ORAL
Qty: 90 TABLET | Refills: 3 | Status: SHIPPED | OUTPATIENT
Start: 2021-06-08 | End: 2022-08-07

## 2021-08-05 RX ORDER — BUPROPION HYDROCHLORIDE 150 MG/1
TABLET ORAL
Qty: 270 TABLET | Refills: 3 | Status: SHIPPED | OUTPATIENT
Start: 2021-08-05 | End: 2022-09-19

## 2021-08-17 ENCOUNTER — PATIENT MESSAGE (OUTPATIENT)
Dept: INTERNAL MEDICINE CLINIC | Age: 46
End: 2021-08-17

## 2021-08-17 DIAGNOSIS — R11.0 NAUSEA: Primary | ICD-10-CM

## 2021-08-17 RX ORDER — ONDANSETRON 4 MG/1
4 TABLET, FILM COATED ORAL
Qty: 30 TABLET | Refills: 1 | Status: SHIPPED | OUTPATIENT
Start: 2021-08-17 | End: 2022-10-07

## 2021-08-17 NOTE — TELEPHONE ENCOUNTER
From: Lucho Epperson  To: Rolo Lidia III, DO  Sent: 8/17/2021 10:40 AM EDT  Subject: Prescription Question    Hi Dr. Diane Fuentes,     Please refill the following - ondansetron hcl 4 mg tablet (Zofran). I'm requesting as on occasion when I feel nauseas, it's not easy for me to throw up with the Linx device.

## 2021-08-17 NOTE — TELEPHONE ENCOUNTER
PCP: Hank Gomez DO    Last appt: 5/24/2021  No future appointments. Requested Prescriptions     Pending Prescriptions Disp Refills    ondansetron hcl (ZOFRAN) 4 mg tablet       Sig: Take 1 Tablet by mouth as needed for Nausea.

## 2022-01-27 RX ORDER — EZETIMIBE 10 MG/1
TABLET ORAL
Qty: 90 TABLET | Refills: 3 | Status: SHIPPED | OUTPATIENT
Start: 2022-01-27

## 2022-03-19 PROBLEM — F41.9 ANXIETY: Status: ACTIVE | Noted: 2019-02-11

## 2022-03-24 DIAGNOSIS — F32.A ANXIETY AND DEPRESSION: ICD-10-CM

## 2022-03-24 DIAGNOSIS — F41.9 ANXIETY AND DEPRESSION: ICD-10-CM

## 2022-03-24 RX ORDER — ALPRAZOLAM 0.25 MG/1
0.25 TABLET ORAL
Qty: 60 TABLET | Refills: 0 | Status: SHIPPED | OUTPATIENT
Start: 2022-03-24 | End: 2022-08-25

## 2022-03-25 RX ORDER — NEBIVOLOL 10 MG/1
TABLET ORAL
Qty: 90 TABLET | Refills: 3 | Status: SHIPPED | OUTPATIENT
Start: 2022-03-25

## 2022-05-02 RX ORDER — VORTIOXETINE 20 MG/1
TABLET, FILM COATED ORAL
Qty: 90 TABLET | Refills: 3 | Status: SHIPPED | OUTPATIENT
Start: 2022-05-02

## 2022-05-10 ENCOUNTER — TELEPHONE (OUTPATIENT)
Dept: INTERNAL MEDICINE CLINIC | Age: 47
End: 2022-05-10

## 2022-05-10 NOTE — TELEPHONE ENCOUNTER
----- Message from Brendandiane Álvarez sent at 5/10/2022 10:06 AM EDT -----  Subject: Appointment Request    Reason for Call: Routine Physical Exam    QUESTIONS  Type of Appointment? Established Patient  Reason for appointment request? No appointments available during search  Additional Information for Provider? Patient is wanting to re schedule her   appt for 5/27. She is coming in for a physical. She just needs a different   day .   ---------------------------------------------------------------------------  --------------  CALL BACK INFO  What is the best way for the office to contact you? OK to leave message on   voicemail  Preferred Call Back Phone Number? [phone]  ---------------------------------------------------------------------------  --------------  SCRIPT ANSWERS  Relationship to Patient? Self  Have your symptoms changed? No  (If the patient has Medicare as their primary insurance coverage ask this   question) Are you requesting a Medicare Annual Wellness Visit? No  (Is the patient requesting a pap smear with their physical exam?)? No  (Is the patient requesting their annual physical and does not need PAP or   AWV per above?)? Yes   Have you been diagnosed with, awaiting test results for, or told that you   are suspected of having COVID-19 (Coronavirus)? (If patient has tested   negative or was tested as a requirement for work, school, or travel and   not based on symptoms, answer no)? No  Within the past 10 days have you developed any of the following symptoms   (answer no if symptoms have been present longer than 10 days or began   more than 10 days ago)? Fever or Chills, Cough, Shortness of breath or   difficulty breathing, Loss of taste or smell, Sore throat, Nasal   congestion, Sneezing or runny nose, Fatigue or generalized body aches   (answer no if pain is specific to a body part e.g. back pain), Diarrhea,   Headache? No  Have you had close contact with someone with COVID-19 in the last 7 days? No  (Service Expert  click yes below to proceed with [facility] As Usual   Scheduling)?  Yes

## 2022-06-16 ENCOUNTER — OFFICE VISIT (OUTPATIENT)
Dept: INTERNAL MEDICINE CLINIC | Age: 47
End: 2022-06-16
Payer: COMMERCIAL

## 2022-06-16 VITALS
DIASTOLIC BLOOD PRESSURE: 83 MMHG | OXYGEN SATURATION: 98 % | RESPIRATION RATE: 16 BRPM | HEIGHT: 64 IN | BODY MASS INDEX: 30.11 KG/M2 | SYSTOLIC BLOOD PRESSURE: 122 MMHG | TEMPERATURE: 98.1 F | WEIGHT: 176.4 LBS | HEART RATE: 72 BPM

## 2022-06-16 DIAGNOSIS — F32.A ANXIETY AND DEPRESSION: ICD-10-CM

## 2022-06-16 DIAGNOSIS — Z00.00 ANNUAL PHYSICAL EXAM: Primary | ICD-10-CM

## 2022-06-16 DIAGNOSIS — Z87.19 HISTORY OF REPAIR OF HIATAL HERNIA: ICD-10-CM

## 2022-06-16 DIAGNOSIS — F41.9 ANXIETY AND DEPRESSION: ICD-10-CM

## 2022-06-16 DIAGNOSIS — Z98.890 HISTORY OF REPAIR OF HIATAL HERNIA: ICD-10-CM

## 2022-06-16 DIAGNOSIS — R73.03 PREDIABETES: ICD-10-CM

## 2022-06-16 DIAGNOSIS — E78.2 MIXED HYPERLIPIDEMIA: ICD-10-CM

## 2022-06-16 DIAGNOSIS — I49.9 CARDIAC ARRHYTHMIA, UNSPECIFIED CARDIAC ARRHYTHMIA TYPE: ICD-10-CM

## 2022-06-16 PROCEDURE — 99396 PREV VISIT EST AGE 40-64: CPT | Performed by: INTERNAL MEDICINE

## 2022-06-16 RX ORDER — TRIAMCINOLONE ACETONIDE 1 MG/G
CREAM TOPICAL
COMMUNITY

## 2022-06-16 RX ORDER — FLUOCINOLONE ACETONIDE 0.11 MG/ML
OIL AURICULAR (OTIC)
COMMUNITY

## 2022-06-16 NOTE — PATIENT INSTRUCTIONS
Well Visit, Ages 25 to 48: Care Instructions  Overview     Well visits can help you stay healthy. Your doctor has checked your overall health and may have suggested ways to take good care of yourself. Your doctor also may have recommended tests. At home, you can help prevent illness with healthy eating, regular exercise, and other steps. Follow-up care is a key part of your treatment and safety. Be sure to make and go to all appointments, and call your doctor if you are having problems. It's also a good idea to know your test results and keep a list of the medicines you take. How can you care for yourself at home? · Get screening tests that you and your doctor decide on. Screening helps find diseases before any symptoms appear. · Eat healthy foods. Choose fruits, vegetables, whole grains, protein, and low-fat dairy foods. Limit fat, especially saturated fat. Reduce salt in your diet. · Limit alcohol. If you are a man, have no more than 2 drinks a day or 14 drinks a week. If you are a woman, have no more than 1 drink a day or 7 drinks a week. · Get at least 30 minutes of physical activity on most days of the week. Walking is a good choice. You also may want to do other activities, such as running, swimming, cycling, or playing tennis or team sports. Discuss any changes in your exercise program with your doctor. · Reach and stay at a healthy weight. This will lower your risk for many problems, such as obesity, diabetes, heart disease, and high blood pressure. · Do not smoke or allow others to smoke around you. If you need help quitting, talk to your doctor about stop-smoking programs and medicines. These can increase your chances of quitting for good. · Care for your mental health. It is easy to get weighed down by worry and stress. Learn strategies to manage stress, like deep breathing and mindfulness, and stay connected with your family and community.  If you find you often feel sad or hopeless, talk with your doctor. Treatment can help. · Talk to your doctor about whether you have any risk factors for sexually transmitted infections (STIs). You can help prevent STIs if you wait to have sex with a new partner (or partners) until you've each been tested for STIs. It also helps if you use condoms (male or female condoms) and if you limit your sex partners to one person who only has sex with you. Vaccines are available for some STIs, such as HPV. · Use birth control if it's important to you to prevent pregnancy. Talk with your doctor about the choices available and what might be best for you. · If you think you may have a problem with alcohol or drug use, talk to your doctor. This includes prescription medicines (such as amphetamines and opioids) and illegal drugs (such as cocaine and methamphetamine). Your doctor can help you figure out what type of treatment is best for you. · Protect your skin from too much sun. When you're outdoors from 10 a.m. to 4 p.m., stay in the shade or cover up with clothing and a hat with a wide brim. Wear sunglasses that block UV rays. Even when it's cloudy, put broad-spectrum sunscreen (SPF 30 or higher) on any exposed skin. · See a dentist one or two times a year for checkups and to have your teeth cleaned. · Wear a seat belt in the car. When should you call for help? Watch closely for changes in your health, and be sure to contact your doctor if you have any problems or symptoms that concern you. Where can you learn more? Go to http://www.[employer].com/  Enter P072 in the search box to learn more about \"Well Visit, Ages 25 to 48: Care Instructions. \"  Current as of: October 6, 2021               Content Version: 13.2  © 9150-4097 Healthwise, [employer]. Care instructions adapted under license by [employer] (which disclaims liability or warranty for this information).  If you have questions about a medical condition or this instruction, always ask your healthcare professional. [place] [address] any warranty or liability for your use of this information. Come back for fasting labs. Lab opens 8 am.  Nothing to eat after MN, but may drink water. Stay active, stay well.

## 2022-06-16 NOTE — PROGRESS NOTES
Tyler Rodriguez is a 48 y.o. female who presents for evaluation of annual cpe. Last seen by me may 24, 2021 in cpe. She has done well since then, though she did trip over gas pump hose about 10 days ago, and landed on her left wrist and shoulder. Has been taking ibuprofen, which has helped. She had torn some muscles in left shoulder about 20 years ago, and needed sx then. ROS:  Constitutional: negative for fevers, chills, anorexia and weight loss  Eyes:   negative for visual disturbance and irritation  ENT:   negative for tinnitus,sore throat,nasal congestion,ear pain,hoarseness  Respiratory:  negative for cough, hemoptysis, dyspnea,wheezing  CV:   negative for chest pain, palpitations, lower extremity edema  GI:   negative for nausea, vomiting, diarrhea, abdominal pain,melena  Genitourinary: negative for frequency, dysuria and hematuria  Musculoskel: negative for myalgias, arthralgias, back pain, muscle weakness.   ++left shoulder joint pain  Neurological:  negative for headaches, dizziness, focal weakness, numbness  Psychiatric:     Negative for depression or anxiety      Past Medical History:   Diagnosis Date    Anxiety     Arthritis 2015    Neck    Depression     Dr. China Rivas    GERD (gastroesophageal reflux disease)     Hemorrhage 2002    postpartum (23 units transfused)    Hypercholesterolemia        Past Surgical History:   Procedure Laterality Date    HX ADENOIDECTOMY      +partial tonsillectomy    HX ADENOIDECTOMY      HX  SECTION  2002    HX COLONOSCOPY  22    Next appt 5 years    HX DILATION AND CURETTAGE      HX ENDOSCOPY  2020    [employer]    HX HERNIA REPAIR  2020    HX HYSTERECTOMY  2002    HX ORTHOPAEDIC  2006    Left Shoulder Slap Tear & torn muscle       Family History   Problem Relation Age of Onset    Cancer Mother          10/21/17    COPD Mother     Emphysema Mother     Lung Disease Mother         COPD    Heart Disease Father  08    Heart Attack Father     No Known Problems Brother     Heart Attack Paternal Grandfather     Heart Disease Paternal Grandfather          prior to 7/3/73    Cancer Maternal Grandmother         Breast Cancer after menopause -  20+ years ago   [facility] Lung Disease Maternal Grandmother         COPD    Cancer Paternal Grandmother         Colon Cancer -  20+ years ago   [facility] Lung Disease Maternal Uncle         COPD       Social History     Socioeconomic History    Marital status:      Spouse name: Not on file    Number of children: Not on file    Years of education: Not on file    Highest education level: Not on file   Occupational History    Not on file   Tobacco Use    Smoking status: Former Smoker     Packs/day: 1.00     Years: 0.00     Pack years: 0.00     Types: Cigarettes    Smokeless tobacco: Never Used    Tobacco comment: Quit 6 years ago? Substance and Sexual Activity    Alcohol use: Yes     Alcohol/week: 6.0 standard drinks     Types: 6 Cans of beer per week    Drug use: Never    Sexual activity: Yes     Partners: Male     Comment: n/a   Other Topics Concern    Not on file   Social History Narrative    Not on file     Social Determinants of Health     Financial Resource Strain:     Difficulty of Paying Living Expenses: Not on file   Food Insecurity:     Worried About Running Out of Food in the Last Year: Not on file    Julianne of Food in the Last Year: Not on file   Transportation Needs:     Lack of Transportation (Medical): Not on file    Lack of Transportation (Non-Medical):  Not on file   Physical Activity:     Days of Exercise per Week: Not on file    Minutes of Exercise per Session: Not on file   Stress:     Feeling of Stress : Not on file   Social Connections:     Frequency of Communication with Friends and Family: Not on file    Frequency of Social Gatherings with Friends and Family: Not on file    Attends Yazidism Services: Not on file   [facility] Active Member of Clubs or Organizations: Not on file    Attends Club or Organization Meetings: Not on file    Marital Status: Not on file   Intimate Partner Violence:     Fear of Current or Ex-Partner: Not on file    Emotionally Abused: Not on file    Physically Abused: Not on file    Sexually Abused: Not on file   Housing Stability:     Unable to Pay for Housing in the Last Year: Not on file    Number of [place] in the Last Year: Not on file    Unstable Housing in the Last Year: Not on file            Visit Vitals  /83 (BP 1 Location: Left upper arm, BP Patient Position: Sitting)   Pulse 72   Temp 98.1 °F (36.7 °C) (Temporal)   Resp 16   Ht 5' 4\" (1.626 m)   Wt 176 lb 6.4 oz (80 kg)   SpO2 98%   BMI 30.28 kg/m²       Physical Examination:   General - Well appearing female  HEENT - PERRL, TM no erythema/opacification, normal nasal turbinates, no oropharyngeal erythema or exudate, MMM  Neck - supple, no bruits, no thyroidomegaly, no lymphadenopathy  Pulm - clear to auscultation bilaterally  Cardio - RRR, normal S1 S2, no murmur  Abd - soft, nontender, no masses, no HSM  Extrem - no edema, +2 distal pulses. Full rom of left arm and shoulder. No weakness  Neuro-  No focal deficits, CN intact     Assessment/Plan:    1. Annual cpe--check cbc, cmp, flp, tsh, a1c  2. ESTEFANIA--does well with trintellix and wellbutrin  3. Cardiac arrhythmia--bystolic keeps rhythm in check  4.  hyperlipids--on zetia and zocor, check flp, cmp  5.  perri--testing was negative  6.  gerd--much improved, sp LINX.       Had colon last month with dr Chance Lovell  rtc one year, sooner if labs abn        Tommy Shillings III, DO

## 2022-06-16 NOTE — PROGRESS NOTES
1. \"Have you been to the ER, urgent care clinic since your last visit? Hospitalized since your last visit?\"     2. \"Have you seen or consulted any other health care providers outside of the [address] [place] [address] since your last visit? \"      3. For patients aged 39-70: Has the patient had a colonoscopy / FIT/ Cologuard? If the patient is female:    4. For patients aged 41-77: Has the patient had a mammogram within the past 2 years? Yes, due in july      5. For patients aged 21-65: Has the patient had a pap smear?  Yes,

## 2022-06-20 ENCOUNTER — LAB ONLY (OUTPATIENT)
Dept: INTERNAL MEDICINE CLINIC | Age: 47
End: 2022-06-20

## 2022-06-20 DIAGNOSIS — Z00.00 ANNUAL PHYSICAL EXAM: ICD-10-CM

## 2022-06-20 DIAGNOSIS — I49.9 CARDIAC ARRHYTHMIA, UNSPECIFIED CARDIAC ARRHYTHMIA TYPE: ICD-10-CM

## 2022-06-20 DIAGNOSIS — R73.03 PREDIABETES: ICD-10-CM

## 2022-06-20 DIAGNOSIS — E78.2 MIXED HYPERLIPIDEMIA: ICD-10-CM

## 2022-06-20 LAB
ALBUMIN SERPL-MCNC: 4.1 G/DL (ref 3.5–5)
ALBUMIN/GLOB SERPL: 1.1 {RATIO} (ref 1.1–2.2)
ALP SERPL-CCNC: 55 U/L (ref 45–117)
ALT SERPL-CCNC: 46 U/L (ref 12–78)
ANION GAP SERPL CALC-SCNC: 3 MMOL/L (ref 5–15)
AST SERPL-CCNC: 28 U/L (ref 15–37)
BASOPHILS # BLD: 0 K/UL (ref 0–0.1)
BASOPHILS NFR BLD: 1 % (ref 0–1)
BILIRUB SERPL-MCNC: 0.6 MG/DL (ref 0.2–1)
BUN SERPL-MCNC: 15 MG/DL (ref 6–20)
BUN/CREAT SERPL: 15 (ref 12–20)
CALCIUM SERPL-MCNC: 9.7 MG/DL (ref 8.5–10.1)
CHLORIDE SERPL-SCNC: 104 MMOL/L (ref 97–108)
CHOLEST SERPL-MCNC: 241 MG/DL
CO2 SERPL-SCNC: 29 MMOL/L (ref 21–32)
CREAT SERPL-MCNC: 0.98 MG/DL (ref 0.55–1.02)
DIFFERENTIAL METHOD BLD: NORMAL
EOSINOPHIL # BLD: 0.1 K/UL (ref 0–0.4)
EOSINOPHIL NFR BLD: 3 % (ref 0–7)
ERYTHROCYTE [DISTWIDTH] IN BLOOD BY AUTOMATED COUNT: 11.8 % (ref 11.5–14.5)
EST. AVERAGE GLUCOSE BLD GHB EST-MCNC: 91 MG/DL
GLOBULIN SER CALC-MCNC: 3.6 G/DL (ref 2–4)
GLUCOSE SERPL-MCNC: 96 MG/DL (ref 65–100)
HBA1C MFR BLD: 4.8 % (ref 4–5.6)
HCT VFR BLD AUTO: 40.5 % (ref 35–47)
HDLC SERPL-MCNC: 76 MG/DL
HDLC SERPL: 3.2 {RATIO} (ref 0–5)
HGB BLD-MCNC: 13.7 G/DL (ref 11.5–16)
IMM GRANULOCYTES # BLD AUTO: 0 K/UL (ref 0–0.04)
IMM GRANULOCYTES NFR BLD AUTO: 0 % (ref 0–0.5)
LDLC SERPL CALC-MCNC: ABNORMAL MG/DL (ref 0–100)
LDLC SERPL DIRECT ASSAY-MCNC: 126 MG/DL (ref 0–100)
LYMPHOCYTES # BLD: 1.5 K/UL (ref 0.8–3.5)
LYMPHOCYTES NFR BLD: 34 % (ref 12–49)
MCH RBC QN AUTO: 31.4 PG (ref 26–34)
MCHC RBC AUTO-ENTMCNC: 33.8 G/DL (ref 30–36.5)
MCV RBC AUTO: 92.7 FL (ref 80–99)
MONOCYTES # BLD: 0.3 K/UL (ref 0–1)
MONOCYTES NFR BLD: 7 % (ref 5–13)
NEUTS SEG # BLD: 2.4 K/UL (ref 1.8–8)
NEUTS SEG NFR BLD: 55 % (ref 32–75)
NRBC # BLD: 0 K/UL (ref 0–0.01)
NRBC BLD-RTO: 0 PER 100 WBC
PLATELET # BLD AUTO: 237 K/UL (ref 150–400)
PMV BLD AUTO: 10.9 FL (ref 8.9–12.9)
POTASSIUM SERPL-SCNC: 4.5 MMOL/L (ref 3.5–5.1)
PROT SERPL-MCNC: 7.7 G/DL (ref 6.4–8.2)
RBC # BLD AUTO: 4.37 M/UL (ref 3.8–5.2)
SODIUM SERPL-SCNC: 136 MMOL/L (ref 136–145)
TRIGL SERPL-MCNC: 481 MG/DL (ref ?–150)
TSH SERPL DL<=0.05 MIU/L-ACNC: 1.48 UIU/ML (ref 0.36–3.74)
VLDLC SERPL CALC-MCNC: ABNORMAL MG/DL
WBC # BLD AUTO: 4.3 K/UL (ref 3.6–11)

## 2022-06-21 NOTE — PROGRESS NOTES
TG bit elevated at 481. Otherwise, labs look good. Continue same meds. Don't usually start meds for TG until above 1000. Continue to work on eating healthily and staying active.

## 2022-06-21 NOTE — PROGRESS NOTES
Letter mailed to patient. TG bit elevated at 481.   Otherwise, labs look good.   Continue same meds.   Don't usually start meds for TG until above 1000.   Continue to work on eating healthily and staying active.

## 2022-08-07 RX ORDER — SIMVASTATIN 40 MG/1
TABLET, FILM COATED ORAL
Qty: 90 TABLET | Refills: 3 | Status: SHIPPED | OUTPATIENT
Start: 2022-08-07

## 2022-08-24 DIAGNOSIS — F32.A ANXIETY AND DEPRESSION: ICD-10-CM

## 2022-08-24 DIAGNOSIS — F41.9 ANXIETY AND DEPRESSION: ICD-10-CM

## 2022-08-25 RX ORDER — ALPRAZOLAM 0.25 MG/1
0.25 TABLET ORAL
Qty: 60 TABLET | Refills: 0 | Status: SHIPPED | OUTPATIENT
Start: 2022-08-25

## 2022-09-19 RX ORDER — BUPROPION HYDROCHLORIDE 150 MG/1
TABLET ORAL
Qty: 270 TABLET | Refills: 3 | Status: SHIPPED | OUTPATIENT
Start: 2022-09-19

## 2022-10-07 DIAGNOSIS — R11.0 NAUSEA: ICD-10-CM

## 2022-10-07 RX ORDER — ONDANSETRON 4 MG/1
TABLET, FILM COATED ORAL
Qty: 30 TABLET | Refills: 1 | Status: SHIPPED | OUTPATIENT
Start: 2022-10-07

## 2022-11-11 DIAGNOSIS — R07.9 CHEST PAIN, UNSPECIFIED TYPE: Primary | ICD-10-CM

## 2022-11-22 DIAGNOSIS — F32.A ANXIETY AND DEPRESSION: ICD-10-CM

## 2022-11-22 DIAGNOSIS — F41.9 ANXIETY AND DEPRESSION: ICD-10-CM

## 2022-11-22 RX ORDER — ALPRAZOLAM 0.25 MG/1
0.25 TABLET ORAL
Qty: 60 TABLET | Refills: 0 | Status: SHIPPED | OUTPATIENT
Start: 2022-11-22

## 2022-11-28 ENCOUNTER — OFFICE VISIT (OUTPATIENT)
Dept: CARDIOLOGY CLINIC | Age: 47
End: 2022-11-28
Payer: COMMERCIAL

## 2022-11-28 VITALS
HEIGHT: 64 IN | RESPIRATION RATE: 16 BRPM | OXYGEN SATURATION: 98 % | DIASTOLIC BLOOD PRESSURE: 82 MMHG | SYSTOLIC BLOOD PRESSURE: 120 MMHG | HEART RATE: 78 BPM | WEIGHT: 176 LBS | BODY MASS INDEX: 30.05 KG/M2

## 2022-11-28 DIAGNOSIS — R07.2 PRECORDIAL CHEST PAIN: ICD-10-CM

## 2022-11-28 DIAGNOSIS — R06.02 SOB (SHORTNESS OF BREATH): Primary | ICD-10-CM

## 2022-11-28 DIAGNOSIS — Z82.49 FAMILY HISTORY OF CORONARY ARTERIOSCLEROSIS: ICD-10-CM

## 2022-11-28 DIAGNOSIS — E78.00 HYPERCHOLESTEREMIA: ICD-10-CM

## 2022-11-28 DIAGNOSIS — R00.2 HEART PALPITATIONS: ICD-10-CM

## 2022-11-28 PROCEDURE — 93000 ELECTROCARDIOGRAM COMPLETE: CPT | Performed by: SPECIALIST

## 2022-11-28 PROCEDURE — 99204 OFFICE O/P NEW MOD 45 MIN: CPT | Performed by: SPECIALIST

## 2022-11-28 RX ORDER — KETOCONAZOLE 20 MG/ML
SHAMPOO, SUSPENSION TOPICAL
COMMUNITY
Start: 2022-11-22

## 2022-11-28 RX ORDER — FLUOCINONIDE TOPICAL SOLUTION USP, 0.05% 0.5 MG/ML
SOLUTION TOPICAL
COMMUNITY
Start: 2022-11-22

## 2022-11-28 NOTE — PATIENT INSTRUCTIONS
You have been scheduled for a stress echocardiogram. Please arrive 15 minutes prior to your appointment. Wear comfortable clothes and shoes. Please do not eat or drink anything other than water two hours prior to test. Please do not take your bystolic the day before or the day of the test.     Please call [phone] to schedule your Coronary Calcium Score. It is about $130 out of pocket (insurance does not pay for this).

## 2022-11-28 NOTE — Clinical Note
11/28/2022    Patient: Reynaldo Fernandez   YOB: 1975   Date of Visit: 11/28/2022     Lulu Yaron III, DO  [address]  [place] [address]  [facility]  [address] [facility] [address]    Dear Garry Toth,      Thank you for referring Ms. Leonidas Hoskins to [facility] for evaluation. My notes for this consultation are attached. If you have questions, please do not hesitate to call me. I look forward to following your patient along with you.       Sincerely,    Lorrayne Opitz, MD

## 2022-11-28 NOTE — PROGRESS NOTES
Chuck Weinstein     1975       Noah Atkinson MD, [facility] [place]  Date of Visit-11/28/2022   PCP is DO [facility] [place]  [facility]  HPI:  Chuck Weinstein is a 50 y.o. female   Patient referred from PCP for cardiac evaluation. Patient has a significant family history and has been feeling worse over the past few months. Today. .. Patient has PFHx of heart attacks with grandparents, father, and brother. Patient has PSHx of smoking but does not anymore. Patient notes she wasn't feeling well about a month ago and was experiencing a cold and  sweats. Patient also she has been experiencing anxiety lately. Patient had an episode of arrhythmias recently at work and had an EKG test. Patient has a links device which sometimes causes tightness in the chest most often when overeating. Patient notes minor shortness of breath when doing activities around the house. Patient notes palpations once in a while but only for a short period of time. Patient denies swelling in feet. She does not recall details but I am able to see in the chart that in 2018 she was seen for tachycardia by Dr. Citlalli Boyle at [facility] and had an echo on 12/29/2018 that was normal.    EKG: Sinus rhythm at 77 normal axis intervals QRS is narrow at 90  Key CAD CHF Meds               simvastatin (ZOCOR) 40 mg tablet TAKE 1 TABLET BY MOUTH EVERY DAY AT NIGHT    nebivoloL (BYSTOLIC) 10 mg tablet TAKE 1 TABLET BY MOUTH EVERY DAY    ezetimibe (ZETIA) 10 mg tablet TAKE 1 TABLET BY MOUTH EVERY DAY    aspirin delayed-release 81 mg tablet Take 81 mg by mouth daily. Assessment/Plan:      The primary encounter diagnosis was SOB (shortness of breath). Diagnoses of Precordial chest pain, Family history of coronary arteriosclerosis, Heart palpitations, and Hypercholesteremia were also pertinent to this visit.        Ms. Milana Lopes is 50years old she has a significant family history of coronary disease. She is an ex-smoker with hyperlipidemia on moderate potency statin. She had tachycardia in the past and has been on a beta-blocker. In addition to her statin she takes Zetia and aspirin. She now describes feeling poorly for about a month or so. This is included chest pressure chest pain shortness of breath. She has increasing symptoms of fatigue. T    ritikak we should exclude coronary disease, valve disease and CHF by doing a stress echo for the current symptoms and to give her some sense of her long-term risk with just a coronary calcium score. She is agreeable for both these test and I will call her with results. I will also encourage her to increase her exercise to 3 times a week of aerobic activity. Note she has a mixed hypertriglyceridemia /lipidemia. He has high triglycerides and her LDL is elevated. I think the calcium score will be helpful to see how aggressive we need to be on getting that LDL down. Patient Instructions   You have been scheduled for a stress echocardiogram. Please arrive 15 minutes prior to your appointment. Wear comfortable clothes and shoes. Please do not eat or drink anything other than water two hours prior to test. Please do not take your bystolic the day before or the day of the test.     Please call [phone] to schedule your Coronary Calcium Score. It is about $130 out of pocket (insurance does not pay for this). Impression:   1. SOB (shortness of breath)    2. Precordial chest pain    3. Family history of coronary arteriosclerosis    4. Heart palpitations    5. Hypercholesteremia           Cardiac History:   No specialty comments available.      Future Appointments   Date Time Provider Steffanie Cherry   12/29/2022 11:00 AM EDE GARCIA BS AMB   12/29/2022 11:00 AM STRESSECHEDE GALLO AMB   6/23/2023  9:00 AM Zafar Roque DO [facility] BS AMB      Patient Care Team:  Mercedez Jean DO as PCP - General (Internal Medicine Physician)  Claude Arvizu DO as PCP - [facility] Empaneled Provider  Jazmín Reyez MD (Obstetrics & Gynecology)  Sivakumar Morris MD (Gastroenterology)  Alexander Cesar MD as Consulting Provider (Cardiovascular Disease Physician)     ROS-except as noted above. . A complete cardiac and respiratory are reviewed and negative except as above ; Resp-denies wheezing  or productive cough,. Const- No unusual weight loss or fever; Neuro-no recent seizure or CVA ; GI- No BRBPR, abdom pain, bloating ; - no  hematuria   see supplement sheet, initialed and to be scanned by staff    Past Medical History:   Diagnosis Date    Anxiety     Arthritis 2015    Neck    Depression     Dr. Camille Thomson    GERD (gastroesophageal reflux disease)     Hemorrhage 5/2002    postpartum (23 units transfused)    Hypercholesterolemia       Social Hx= reports that she has quit smoking. Her smoking use included cigarettes. She smoked an average of 1.00 packs per day. She has never used smokeless tobacco. She reports current alcohol use of about 6.0 standard drinks per week. She reports that she does not use drugs. Family Hx- family history includes COPD in her mother; Cancer in her maternal grandmother, mother, and paternal grandmother; Emphysema in her mother; Heart Attack in her father and paternal grandfather; Heart Disease in her father and paternal grandfather; Lung Disease in her maternal grandmother, maternal uncle, and mother; No Known Problems in her brother. No Known Allergies     Exam and Labs:  Visit Vitals  /82   Pulse 78   Resp 16   Ht 5' 4\" (1.626 m)   Wt 176 lb (79.8 kg)   SpO2 98%   BMI 30.21 kg/m²    @Constitutional:  NAD, comfortable  Head: NC,AT. Eyes: No scleral icterus. Neck:  Neck supple. No JVD present. Throat: moist mucous membranes. Chest: Effort normal & normal respiratory excursion . Neurological: alert, conversant and oriented . Skin: Skin is not cold.  No obvious systemic rash noted. Not diaphoretic. No erythema. Psychiatric:  Grossly normal mood and affect. Behavior appears normal. Extremities:  no clubbing or cyanosis. Abdomen: non distended    Lungs:breath sounds normal. No stridor. distress, wheezes or  Rales. Heart:    normal rate, regular rhythm, normal S1, S2, no murmurs, rubs, clicks or gallops , PMI non displaced. Edema: Edema is none. No carotid bruit    Lab Results   Component Value Date/Time    Cholesterol, total 241 (H) 06/20/2022 12:18 PM    HDL Cholesterol 76 06/20/2022 12:18 PM    LDL,Direct 126 (H) 06/20/2022 12:18 PM    LDL, calculated Not calculated due to elevated triglyceride level 06/20/2022 12:18 PM    Triglyceride 481 (H) 06/20/2022 12:18 PM    CHOL/HDL Ratio 3.2 06/20/2022 12:18 PM     Lab Results   Component Value Date/Time    Sodium 136 06/20/2022 12:18 PM    Potassium 4.5 06/20/2022 12:18 PM    Chloride 104 06/20/2022 12:18 PM    CO2 29 06/20/2022 12:18 PM    Anion gap 3 (L) 06/20/2022 12:18 PM    Glucose 96 06/20/2022 12:18 PM    BUN 15 06/20/2022 12:18 PM    Creatinine 0.98 06/20/2022 12:18 PM    BUN/Creatinine ratio 15 06/20/2022 12:18 PM    GFR est AA >60 06/20/2022 12:18 PM    GFR est non-AA >60 06/20/2022 12:18 PM    Calcium 9.7 06/20/2022 12:18 PM      Wt Readings from Last 3 Encounters:   06/16/22 176 lb 6.4 oz (80 kg)   05/24/21 175 lb 12.8 oz (79.7 kg)   11/23/20 184 lb 12.8 oz (83.8 kg)      BP Readings from Last 3 Encounters:   06/16/22 122/83   05/24/21 113/76   11/23/20 112/76      Current Outpatient Medications   Medication Sig    ALPRAZolam (XANAX) 0.25 mg tablet TAKE 1 TABLET BY MOUTH TWO (2) TIMES DAILY AS NEEDED FOR ANXIETY. MAX DAILY AMOUNT: 0.5 MG.    ondansetron hcl (ZOFRAN) 4 mg tablet TAKE 1 TABLET BY MOUTH EVERY 8 HOURS AS NEEDED FOR NAUSEA AND VOMITING    buPROPion XL (WELLBUTRIN XL) 150 mg tablet TAKE 3 TABS BY MOUTH EVERY MORNING.     simvastatin (ZOCOR) 40 mg tablet TAKE 1 TABLET BY MOUTH EVERY DAY AT NIGHT triamcinolone acetonide (KENALOG) 0.1 % topical cream triamcinolone acetonide 0.1 % topical cream   APPLY TO AFFECTED AREA DAILY AS NEEDED FOR OUTER EAR ITCHING    fluocinolone acetonide oiL 0.01 % drop fluocinolone acetonide oil 0.01 % ear drops   INSTILL 5 DROPS INTO AFFECTED EAR(S) AT BEDTIME, IF NO IMPROVEMENT, INCREASE TO TWICE DAILY    Trintellix 20 mg tablet TAKE 1 TABLET BY MOUTH EVERY DAY    nebivoloL (BYSTOLIC) 10 mg tablet TAKE 1 TABLET BY MOUTH EVERY DAY    ezetimibe (ZETIA) 10 mg tablet TAKE 1 TABLET BY MOUTH EVERY DAY    calcium-vitamin D (OS-DEMOND +D3) 500 mg-200 unit per tablet Take 1 Tablet by mouth daily. ascorbic acid, vitamin C, (VITAMIN C) 500 mg tablet Take  by mouth daily. aspirin delayed-release 81 mg tablet Take 81 mg by mouth daily. valACYclovir (VALTREX) 1 gram tablet TAKE 1 TAB BY MOUTH EVERY TWELVE (12) HOURS AS NEEDED. multivitamin (ONE A DAY) tablet Take 1 Tab by mouth daily. No current facility-administered medications for this visit. Impression see above.       Written by Honey Liang, as dictated by Terri Bustos MD   agree with the note as scribed

## 2022-12-12 ENCOUNTER — HOSPITAL ENCOUNTER (OUTPATIENT)
Dept: CT IMAGING | Age: 47
Discharge: HOME | End: 2022-12-12
Attending: SPECIALIST
Payer: SELF-PAY

## 2022-12-12 DIAGNOSIS — R06.02 SOB (SHORTNESS OF BREATH): ICD-10-CM

## 2022-12-12 DIAGNOSIS — R07.2 PRECORDIAL CHEST PAIN: ICD-10-CM

## 2022-12-12 PROCEDURE — 75571 CT HRT W/O DYE W/CA TEST: CPT

## 2022-12-29 ENCOUNTER — ANCILLARY PROCEDURE (OUTPATIENT)
Dept: CARDIOLOGY CLINIC | Age: 47
End: 2022-12-29

## 2022-12-29 VITALS
WEIGHT: 176 LBS | DIASTOLIC BLOOD PRESSURE: 76 MMHG | SYSTOLIC BLOOD PRESSURE: 126 MMHG | HEIGHT: 64 IN | BODY MASS INDEX: 30.05 KG/M2

## 2022-12-29 DIAGNOSIS — R07.2 PRECORDIAL CHEST PAIN: ICD-10-CM

## 2022-12-29 DIAGNOSIS — R06.02 SOB (SHORTNESS OF BREATH): ICD-10-CM

## 2022-12-29 LAB
STRESS ANGINA INDEX: 0
STRESS BASELINE DIAS BP: 76 MMHG
STRESS BASELINE HR: 83 BPM
STRESS BASELINE ST DEPRESSION: 0 MM
STRESS BASELINE SYS BP: 126 MMHG
STRESS ESTIMATED WORKLOAD: 10.1 METS
STRESS EXERCISE DUR MIN: 7 MIN
STRESS EXERCISE DUR SEC: 51 SEC
STRESS O2 SAT PEAK: 95 %
STRESS O2 SAT REST: 99 %
STRESS PEAK DIAS BP: 78 MMHG
STRESS PEAK SYS BP: 148 MMHG
STRESS PERCENT HR ACHIEVED: 88 %
STRESS POST PEAK HR: 151 BPM
STRESS RATE PRESSURE PRODUCT: NORMAL BPM*MMHG
STRESS SR DUKE TREADMILL SCORE: 8
STRESS ST DEPRESSION: 0 MM
STRESS TARGET HR: 171 BPM

## 2022-12-29 PROCEDURE — 93351 STRESS TTE COMPLETE: CPT | Performed by: SPECIALIST

## 2022-12-29 NOTE — PROGRESS NOTES
Good news  The stress echo and the Coronary Calcium score are both normal  This means you are unlikely to have current significant blockage  ( test is >85% accurate ) and your risk for that in the next few years is low. Continue with usual heart healthy life style with exercise, diet, blood pressure control and follow up of your cholesterol  We will see you back as needed  Call or return to clinic prn if these symptoms worsen or fail to improve as anticipated. How are you feeling, has that fatigue or chest pressure gotten any better? ??    Future Appointments  6/23/2023  9:00 AM    Melony Alva III* [facility]                BS AMB

## 2023-03-15 RX ORDER — NEBIVOLOL 10 MG/1
TABLET ORAL
Qty: 90 TABLET | Refills: 3 | Status: SHIPPED | OUTPATIENT
Start: 2023-03-15

## 2023-05-05 RX ORDER — VORTIOXETINE 20 MG/1
TABLET, FILM COATED ORAL
Qty: 90 TABLET | Refills: 3 | Status: SHIPPED | OUTPATIENT
Start: 2023-05-05

## 2023-05-16 RX ORDER — KETOCONAZOLE 20 MG/ML
SHAMPOO TOPICAL
COMMUNITY
Start: 2022-11-22

## 2023-05-16 RX ORDER — NEBIVOLOL 10 MG/1
1 TABLET ORAL DAILY
COMMUNITY
Start: 2023-03-15

## 2023-05-16 RX ORDER — FLUOCINOLONE ACETONIDE 0.11 MG/ML
OIL AURICULAR (OTIC)
COMMUNITY

## 2023-05-16 RX ORDER — ASCORBIC ACID 500 MG
TABLET ORAL DAILY
COMMUNITY

## 2023-05-16 RX ORDER — BUPROPION HYDROCHLORIDE 150 MG/1
TABLET ORAL
COMMUNITY
Start: 2022-09-19

## 2023-05-16 RX ORDER — B-COMPLEX WITH VITAMIN C
1 TABLET ORAL DAILY
COMMUNITY

## 2023-05-16 RX ORDER — ONDANSETRON 4 MG/1
1 TABLET, FILM COATED ORAL EVERY 8 HOURS PRN
COMMUNITY
Start: 2022-10-07

## 2023-05-16 RX ORDER — FLUOCINONIDE TOPICAL SOLUTION USP, 0.05% 0.5 MG/ML
SOLUTION TOPICAL
COMMUNITY
Start: 2022-11-22

## 2023-05-16 RX ORDER — ASPIRIN 81 MG/1
81 TABLET ORAL DAILY
COMMUNITY

## 2023-05-16 RX ORDER — SIMVASTATIN 40 MG
1 TABLET ORAL NIGHTLY
COMMUNITY
Start: 2022-08-07

## 2023-05-16 RX ORDER — EZETIMIBE 10 MG/1
1 TABLET ORAL DAILY
COMMUNITY
Start: 2023-03-07 | End: 2023-06-05

## 2023-05-16 RX ORDER — ALPRAZOLAM 0.25 MG/1
0.25 TABLET ORAL 2 TIMES DAILY PRN
COMMUNITY
Start: 2022-11-22

## 2023-06-05 RX ORDER — EZETIMIBE 10 MG/1
TABLET ORAL
Qty: 90 TABLET | Refills: 3 | Status: SHIPPED | OUTPATIENT
Start: 2023-06-05

## 2023-06-23 ENCOUNTER — OFFICE VISIT (OUTPATIENT)
Age: 48
End: 2023-06-23
Payer: COMMERCIAL

## 2023-06-23 VITALS
OXYGEN SATURATION: 98 % | TEMPERATURE: 97.8 F | HEIGHT: 64 IN | RESPIRATION RATE: 16 BRPM | SYSTOLIC BLOOD PRESSURE: 106 MMHG | DIASTOLIC BLOOD PRESSURE: 72 MMHG | WEIGHT: 187.2 LBS | HEART RATE: 79 BPM | BODY MASS INDEX: 31.96 KG/M2

## 2023-06-23 DIAGNOSIS — I49.9 CARDIAC ARRHYTHMIA, UNSPECIFIED CARDIAC ARRHYTHMIA TYPE: ICD-10-CM

## 2023-06-23 DIAGNOSIS — Z00.00 ANNUAL PHYSICAL EXAM: Primary | ICD-10-CM

## 2023-06-23 DIAGNOSIS — E78.2 MIXED HYPERLIPIDEMIA: ICD-10-CM

## 2023-06-23 DIAGNOSIS — F41.1 GAD (GENERALIZED ANXIETY DISORDER): ICD-10-CM

## 2023-06-23 DIAGNOSIS — R73.03 PREDIABETES: ICD-10-CM

## 2023-06-23 PROCEDURE — 99396 PREV VISIT EST AGE 40-64: CPT | Performed by: INTERNAL MEDICINE

## 2023-06-23 RX ORDER — ZOSTER VACCINE RECOMBINANT, ADJUVANTED 50 MCG/0.5
0.5 KIT INTRAMUSCULAR SEE ADMIN INSTRUCTIONS
Qty: 0.5 ML | Refills: 0 | Status: SHIPPED | OUTPATIENT
Start: 2023-06-23 | End: 2023-12-20

## 2023-06-23 SDOH — ECONOMIC STABILITY: HOUSING INSECURITY
IN THE LAST 12 MONTHS, WAS THERE A TIME WHEN YOU DID NOT HAVE A STEADY PLACE TO SLEEP OR SLEPT IN A SHELTER (INCLUDING NOW)?: NO

## 2023-06-23 SDOH — ECONOMIC STABILITY: FOOD INSECURITY: WITHIN THE PAST 12 MONTHS, YOU WORRIED THAT YOUR FOOD WOULD RUN OUT BEFORE YOU GOT MONEY TO BUY MORE.: NEVER TRUE

## 2023-06-23 SDOH — ECONOMIC STABILITY: FOOD INSECURITY: WITHIN THE PAST 12 MONTHS, THE FOOD YOU BOUGHT JUST DIDN'T LAST AND YOU DIDN'T HAVE MONEY TO GET MORE.: NEVER TRUE

## 2023-06-23 SDOH — ECONOMIC STABILITY: INCOME INSECURITY: HOW HARD IS IT FOR YOU TO PAY FOR THE VERY BASICS LIKE FOOD, HOUSING, MEDICAL CARE, AND HEATING?: NOT HARD AT ALL

## 2023-06-23 ASSESSMENT — PATIENT HEALTH QUESTIONNAIRE - PHQ9
2. FEELING DOWN, DEPRESSED OR HOPELESS: 0
1. LITTLE INTEREST OR PLEASURE IN DOING THINGS: 0
4. FEELING TIRED OR HAVING LITTLE ENERGY: 0
6. FEELING BAD ABOUT YOURSELF - OR THAT YOU ARE A FAILURE OR HAVE LET YOURSELF OR YOUR FAMILY DOWN: 0
9. THOUGHTS THAT YOU WOULD BE BETTER OFF DEAD, OR OF HURTING YOURSELF: 0
8. MOVING OR SPEAKING SO SLOWLY THAT OTHER PEOPLE COULD HAVE NOTICED. OR THE OPPOSITE, BEING SO FIGETY OR RESTLESS THAT YOU HAVE BEEN MOVING AROUND A LOT MORE THAN USUAL: 0
3. TROUBLE FALLING OR STAYING ASLEEP: 0
SUM OF ALL RESPONSES TO PHQ QUESTIONS 1-9: 0
7. TROUBLE CONCENTRATING ON THINGS, SUCH AS READING THE NEWSPAPER OR WATCHING TELEVISION: 0
SUM OF ALL RESPONSES TO PHQ9 QUESTIONS 1 & 2: 0
5. POOR APPETITE OR OVEREATING: 0
SUM OF ALL RESPONSES TO PHQ QUESTIONS 1-9: 0
10. IF YOU CHECKED OFF ANY PROBLEMS, HOW DIFFICULT HAVE THESE PROBLEMS MADE IT FOR YOU TO DO YOUR WORK, TAKE CARE OF THINGS AT HOME, OR GET ALONG WITH OTHER PEOPLE: 0

## 2023-06-23 NOTE — PATIENT INSTRUCTIONS
Would get started on shingrix vaccine to help prevent shingles after your 48th birthday. It is a 2 shot series, and you need 2nd shot anytime from 2-6 months after the first shot.

## 2023-06-23 NOTE — PROGRESS NOTES
1. \"Have you been to the ER, urgent care clinic since your last visit? Hospitalized since your last visit? \" Yes patient first sprained ankle    2. \"Have you seen or consulted any other health care providers outside of the [address] [place] [address] since your last visit? \" Yes see above      3. For patients aged 39-70: Has the patient had a colonoscopy / FIT/ Cologuard? Yes - no Care Gap present      If the patient is female:    4. For patients aged 41-77: Has the patient had a mammogram within the past 2 years? Yes - no Care Gap present      5. For patients aged 21-65: Has the patient had a pap smear?  Yes - no Care Gap present
Father          08    Heart Attack Father     Heart Attack Brother     Heart Disease Paternal Grandfather          prior to 7/3/73    Heart Attack Paternal Grandfather     Lung Disease Maternal Grandmother         COPD    Cancer Maternal Grandmother         Breast Cancer after menopause -  20+ years ago       Social History     Socioeconomic History    Marital status:      Spouse name: Not on file    Number of children: Not on file    Years of education: Not on file    Highest education level: Not on file   Occupational History    Not on file   Tobacco Use    Smoking status: Former     Packs/day: 1.00     Types: Cigarettes    Smokeless tobacco: Never   Substance and Sexual Activity    Alcohol use: Yes    Drug use: Never    Sexual activity: Not on file     Comment: n/a   Other Topics Concern    Not on file   Social History Narrative    Not on file     Social Determinants of Health     Financial Resource Strain: Low Risk     Difficulty of Paying Living Expenses: Not hard at all   Food Insecurity: No Food Insecurity    Worried About Running Out of Food in the Last Year: Never true    [address] in the Last Year: Never true   Transportation Needs: Unknown    Lack of Transportation (Medical): Not on file    Lack of Transportation (Non-Medical):  No   Physical Activity: Not on file   Stress: Not on file   Social Connections: Not on file   Intimate Partner Violence: Not on file   Housing Stability: Unknown    Unable to Pay for Housing in the Last Year: Not on file    Number of Places Lived in the Last Year: Not on file    Unstable Housing in the Last Year: No          /72 (Site: Left Upper Arm, Position: Sitting)   Pulse 79   Temp 97.8 °F (36.6 °C) (Temporal)   Resp 16   Ht 5' 4\" (1.626 m)   Wt 187 lb 3.2 oz (84.9 kg)   SpO2 98%   BMI 32.13 kg/m²     Physical Examination:   General - Well appearing female  HEENT - PERRL, TM no erythema/opacification, normal nasal turbinates, no

## 2023-06-24 LAB
ALBUMIN SERPL-MCNC: 4.2 G/DL (ref 3.5–5)
ALBUMIN/GLOB SERPL: 1.2 (ref 1.1–2.2)
ALP SERPL-CCNC: 49 U/L (ref 45–117)
ALT SERPL-CCNC: 44 U/L (ref 12–78)
ANION GAP SERPL CALC-SCNC: 4 MMOL/L (ref 5–15)
AST SERPL-CCNC: 24 U/L (ref 15–37)
BASOPHILS # BLD: 0.1 K/UL (ref 0–0.1)
BASOPHILS NFR BLD: 1 % (ref 0–1)
BILIRUB SERPL-MCNC: 0.6 MG/DL (ref 0.2–1)
BUN SERPL-MCNC: 15 MG/DL (ref 6–20)
BUN/CREAT SERPL: 15 (ref 12–20)
CALCIUM SERPL-MCNC: 9.6 MG/DL (ref 8.5–10.1)
CHLORIDE SERPL-SCNC: 104 MMOL/L (ref 97–108)
CHOLEST SERPL-MCNC: 199 MG/DL
CO2 SERPL-SCNC: 29 MMOL/L (ref 21–32)
CREAT SERPL-MCNC: 1 MG/DL (ref 0.55–1.02)
DIFFERENTIAL METHOD BLD: NORMAL
EOSINOPHIL # BLD: 0.2 K/UL (ref 0–0.4)
EOSINOPHIL NFR BLD: 5 % (ref 0–7)
ERYTHROCYTE [DISTWIDTH] IN BLOOD BY AUTOMATED COUNT: 11.5 % (ref 11.5–14.5)
EST. AVERAGE GLUCOSE BLD GHB EST-MCNC: 74 MG/DL
GLOBULIN SER CALC-MCNC: 3.6 G/DL (ref 2–4)
GLUCOSE SERPL-MCNC: 89 MG/DL (ref 65–100)
HBA1C MFR BLD: 4.2 % (ref 4–5.6)
HCT VFR BLD AUTO: 40.6 % (ref 35–47)
HDLC SERPL-MCNC: 71 MG/DL
HDLC SERPL: 2.8 (ref 0–5)
HGB BLD-MCNC: 13.2 G/DL (ref 11.5–16)
IMM GRANULOCYTES # BLD AUTO: 0 K/UL (ref 0–0.04)
IMM GRANULOCYTES NFR BLD AUTO: 0 % (ref 0–0.5)
LDLC SERPL CALC-MCNC: 83.8 MG/DL (ref 0–100)
LYMPHOCYTES # BLD: 2 K/UL (ref 0.8–3.5)
LYMPHOCYTES NFR BLD: 38 % (ref 12–49)
MCH RBC QN AUTO: 30.8 PG (ref 26–34)
MCHC RBC AUTO-ENTMCNC: 32.5 G/DL (ref 30–36.5)
MCV RBC AUTO: 94.6 FL (ref 80–99)
MONOCYTES # BLD: 0.5 K/UL (ref 0–1)
MONOCYTES NFR BLD: 9 % (ref 5–13)
NEUTS SEG # BLD: 2.5 K/UL (ref 1.8–8)
NEUTS SEG NFR BLD: 47 % (ref 32–75)
NRBC # BLD: 0 K/UL (ref 0–0.01)
NRBC BLD-RTO: 0 PER 100 WBC
PLATELET # BLD AUTO: 257 K/UL (ref 150–400)
PMV BLD AUTO: 11.4 FL (ref 8.9–12.9)
POTASSIUM SERPL-SCNC: 4.7 MMOL/L (ref 3.5–5.1)
PROT SERPL-MCNC: 7.8 G/DL (ref 6.4–8.2)
RBC # BLD AUTO: 4.29 M/UL (ref 3.8–5.2)
SODIUM SERPL-SCNC: 137 MMOL/L (ref 136–145)
TRIGL SERPL-MCNC: 221 MG/DL
TSH SERPL DL<=0.05 MIU/L-ACNC: 1.74 UIU/ML (ref 0.36–3.74)
VLDLC SERPL CALC-MCNC: 44.2 MG/DL
WBC # BLD AUTO: 5.2 K/UL (ref 3.6–11)

## 2023-07-31 RX ORDER — SIMVASTATIN 40 MG
TABLET ORAL
Qty: 90 TABLET | Refills: 3 | Status: SHIPPED | OUTPATIENT
Start: 2023-07-31

## 2023-08-30 RX ORDER — BUPROPION HYDROCHLORIDE 150 MG/1
TABLET ORAL
Qty: 270 TABLET | Refills: 3 | Status: SHIPPED | OUTPATIENT
Start: 2023-08-30

## 2023-11-15 DIAGNOSIS — F41.9 ANXIETY DISORDER, UNSPECIFIED: ICD-10-CM

## 2023-11-15 DIAGNOSIS — R11.0 NAUSEA: ICD-10-CM

## 2023-11-15 RX ORDER — ONDANSETRON 4 MG/1
4 TABLET, FILM COATED ORAL EVERY 8 HOURS PRN
Qty: 30 TABLET | Refills: 1 | Status: SHIPPED | OUTPATIENT
Start: 2023-11-15

## 2023-11-15 RX ORDER — ALPRAZOLAM 0.25 MG/1
TABLET ORAL
Qty: 60 TABLET | Refills: 0 | Status: SHIPPED | OUTPATIENT
Start: 2023-11-15 | End: 2024-11-09

## 2024-02-20 RX ORDER — NEBIVOLOL 10 MG/1
10 TABLET ORAL DAILY
Qty: 90 TABLET | Refills: 3 | Status: SHIPPED | OUTPATIENT
Start: 2024-02-20

## 2024-02-23 RX ORDER — SIMVASTATIN 40 MG
TABLET ORAL
Qty: 90 TABLET | Refills: 3 | Status: SHIPPED | OUTPATIENT
Start: 2024-02-23

## 2024-05-16 RX ORDER — EZETIMIBE 10 MG/1
TABLET ORAL
Qty: 90 TABLET | Refills: 3 | Status: SHIPPED | OUTPATIENT
Start: 2024-05-16

## 2024-06-02 RX ORDER — VORTIOXETINE 20 MG/1
20 TABLET, FILM COATED ORAL DAILY
Qty: 90 TABLET | Refills: 3 | Status: SHIPPED | OUTPATIENT
Start: 2024-06-02

## 2024-06-28 ENCOUNTER — OFFICE VISIT (OUTPATIENT)
Age: 49
End: 2024-06-28
Payer: COMMERCIAL

## 2024-06-28 VITALS
HEIGHT: 64 IN | SYSTOLIC BLOOD PRESSURE: 105 MMHG | BODY MASS INDEX: 31.45 KG/M2 | OXYGEN SATURATION: 98 % | HEART RATE: 77 BPM | DIASTOLIC BLOOD PRESSURE: 71 MMHG | WEIGHT: 184.2 LBS | TEMPERATURE: 97.5 F | RESPIRATION RATE: 16 BRPM

## 2024-06-28 DIAGNOSIS — R73.03 PREDIABETES: ICD-10-CM

## 2024-06-28 DIAGNOSIS — E78.2 MIXED HYPERLIPIDEMIA: ICD-10-CM

## 2024-06-28 DIAGNOSIS — I49.9 CARDIAC ARRHYTHMIA, UNSPECIFIED CARDIAC ARRHYTHMIA TYPE: ICD-10-CM

## 2024-06-28 DIAGNOSIS — Z00.00 ANNUAL PHYSICAL EXAM: Primary | ICD-10-CM

## 2024-06-28 DIAGNOSIS — E66.9 OBESITY (BMI 30-39.9): ICD-10-CM

## 2024-06-28 DIAGNOSIS — F41.1 GAD (GENERALIZED ANXIETY DISORDER): ICD-10-CM

## 2024-06-28 DIAGNOSIS — Z00.00 ANNUAL PHYSICAL EXAM: ICD-10-CM

## 2024-06-28 LAB
ALBUMIN SERPL-MCNC: 4.2 G/DL (ref 3.5–5)
ALBUMIN/GLOB SERPL: 1.2 (ref 1.1–2.2)
ALP SERPL-CCNC: 60 U/L (ref 45–117)
ALT SERPL-CCNC: 70 U/L (ref 12–78)
ANION GAP SERPL CALC-SCNC: 3 MMOL/L (ref 5–15)
AST SERPL-CCNC: 38 U/L (ref 15–37)
BASOPHILS # BLD: 0 K/UL (ref 0–0.1)
BASOPHILS NFR BLD: 1 % (ref 0–1)
BILIRUB SERPL-MCNC: 0.4 MG/DL (ref 0.2–1)
BUN SERPL-MCNC: 22 MG/DL (ref 6–20)
BUN/CREAT SERPL: 21 (ref 12–20)
CALCIUM SERPL-MCNC: 9.8 MG/DL (ref 8.5–10.1)
CHLORIDE SERPL-SCNC: 107 MMOL/L (ref 97–108)
CHOLEST SERPL-MCNC: 198 MG/DL
CO2 SERPL-SCNC: 29 MMOL/L (ref 21–32)
CREAT SERPL-MCNC: 1.07 MG/DL (ref 0.55–1.02)
DIFFERENTIAL METHOD BLD: ABNORMAL
EOSINOPHIL # BLD: 0.4 K/UL (ref 0–0.4)
EOSINOPHIL NFR BLD: 8 % (ref 0–7)
ERYTHROCYTE [DISTWIDTH] IN BLOOD BY AUTOMATED COUNT: 11.6 % (ref 11.5–14.5)
EST. AVERAGE GLUCOSE BLD GHB EST-MCNC: 85 MG/DL
GLOBULIN SER CALC-MCNC: 3.5 G/DL (ref 2–4)
GLUCOSE SERPL-MCNC: 102 MG/DL (ref 65–100)
HBA1C MFR BLD: 4.6 % (ref 4–5.6)
HCT VFR BLD AUTO: 38.1 % (ref 35–47)
HDLC SERPL-MCNC: 71 MG/DL
HDLC SERPL: 2.8 (ref 0–5)
HGB BLD-MCNC: 13.1 G/DL (ref 11.5–16)
IMM GRANULOCYTES # BLD AUTO: 0 K/UL (ref 0–0.04)
IMM GRANULOCYTES NFR BLD AUTO: 1 % (ref 0–0.5)
LDLC SERPL CALC-MCNC: 104 MG/DL (ref 0–100)
LYMPHOCYTES # BLD: 1.4 K/UL (ref 0.8–3.5)
LYMPHOCYTES NFR BLD: 29 % (ref 12–49)
MCH RBC QN AUTO: 31.5 PG (ref 26–34)
MCHC RBC AUTO-ENTMCNC: 34.4 G/DL (ref 30–36.5)
MCV RBC AUTO: 91.6 FL (ref 80–99)
MONOCYTES # BLD: 0.5 K/UL (ref 0–1)
MONOCYTES NFR BLD: 10 % (ref 5–13)
NEUTS SEG # BLD: 2.6 K/UL (ref 1.8–8)
NEUTS SEG NFR BLD: 51 % (ref 32–75)
NRBC # BLD: 0 K/UL (ref 0–0.01)
NRBC BLD-RTO: 0 PER 100 WBC
PLATELET # BLD AUTO: 249 K/UL (ref 150–400)
PMV BLD AUTO: 11.2 FL (ref 8.9–12.9)
POTASSIUM SERPL-SCNC: 5.2 MMOL/L (ref 3.5–5.1)
PROT SERPL-MCNC: 7.7 G/DL (ref 6.4–8.2)
RBC # BLD AUTO: 4.16 M/UL (ref 3.8–5.2)
SODIUM SERPL-SCNC: 139 MMOL/L (ref 136–145)
TRIGL SERPL-MCNC: 115 MG/DL
TSH SERPL DL<=0.05 MIU/L-ACNC: 1.7 UIU/ML (ref 0.36–3.74)
VLDLC SERPL CALC-MCNC: 23 MG/DL
WBC # BLD AUTO: 5 K/UL (ref 3.6–11)

## 2024-06-28 PROCEDURE — 99396 PREV VISIT EST AGE 40-64: CPT | Performed by: INTERNAL MEDICINE

## 2024-06-28 RX ORDER — TRIAMCINOLONE ACETONIDE 1 MG/G
CREAM TOPICAL 2 TIMES DAILY
Qty: 15 G | Refills: 1 | Status: SHIPPED | OUTPATIENT
Start: 2024-06-28

## 2024-06-28 RX ORDER — TRIAMCINOLONE ACETONIDE 1 MG/G
CREAM TOPICAL 2 TIMES DAILY
COMMUNITY
End: 2024-06-28 | Stop reason: SDUPTHER

## 2024-06-28 RX ORDER — OMEGA-3/DHA/EPA/FISH OIL 60 MG-90MG
500 CAPSULE ORAL PRN
COMMUNITY
Start: 2015-12-17

## 2024-06-28 SDOH — ECONOMIC STABILITY: FOOD INSECURITY: WITHIN THE PAST 12 MONTHS, YOU WORRIED THAT YOUR FOOD WOULD RUN OUT BEFORE YOU GOT MONEY TO BUY MORE.: NEVER TRUE

## 2024-06-28 SDOH — ECONOMIC STABILITY: INCOME INSECURITY: HOW HARD IS IT FOR YOU TO PAY FOR THE VERY BASICS LIKE FOOD, HOUSING, MEDICAL CARE, AND HEATING?: NOT HARD AT ALL

## 2024-06-28 SDOH — ECONOMIC STABILITY: FOOD INSECURITY: WITHIN THE PAST 12 MONTHS, THE FOOD YOU BOUGHT JUST DIDN'T LAST AND YOU DIDN'T HAVE MONEY TO GET MORE.: NEVER TRUE

## 2024-06-28 ASSESSMENT — PATIENT HEALTH QUESTIONNAIRE - PHQ9
1. LITTLE INTEREST OR PLEASURE IN DOING THINGS: NOT AT ALL
SUM OF ALL RESPONSES TO PHQ QUESTIONS 1-9: 0
10. IF YOU CHECKED OFF ANY PROBLEMS, HOW DIFFICULT HAVE THESE PROBLEMS MADE IT FOR YOU TO DO YOUR WORK, TAKE CARE OF THINGS AT HOME, OR GET ALONG WITH OTHER PEOPLE: NOT DIFFICULT AT ALL
4. FEELING TIRED OR HAVING LITTLE ENERGY: NOT AT ALL
9. THOUGHTS THAT YOU WOULD BE BETTER OFF DEAD, OR OF HURTING YOURSELF: NOT AT ALL
8. MOVING OR SPEAKING SO SLOWLY THAT OTHER PEOPLE COULD HAVE NOTICED. OR THE OPPOSITE, BEING SO FIGETY OR RESTLESS THAT YOU HAVE BEEN MOVING AROUND A LOT MORE THAN USUAL: NOT AT ALL
SUM OF ALL RESPONSES TO PHQ9 QUESTIONS 1 & 2: 0
7. TROUBLE CONCENTRATING ON THINGS, SUCH AS READING THE NEWSPAPER OR WATCHING TELEVISION: NOT AT ALL
5. POOR APPETITE OR OVEREATING: NOT AT ALL
3. TROUBLE FALLING OR STAYING ASLEEP: NOT AT ALL
6. FEELING BAD ABOUT YOURSELF - OR THAT YOU ARE A FAILURE OR HAVE LET YOURSELF OR YOUR FAMILY DOWN: NOT AT ALL
SUM OF ALL RESPONSES TO PHQ QUESTIONS 1-9: 0
2. FEELING DOWN, DEPRESSED OR HOPELESS: NOT AT ALL
SUM OF ALL RESPONSES TO PHQ QUESTIONS 1-9: 0
SUM OF ALL RESPONSES TO PHQ QUESTIONS 1-9: 0

## 2024-06-28 NOTE — PROGRESS NOTES
\"Have you been to the ER, urgent care clinic since your last visit?  Hospitalized since your last visit?\"    YES - When: approximately 3 months ago.  Where and Why: Pt First-Knee Sprain.    “Have you seen or consulted any other health care providers outside of [facility] since your last visit?”    Dr. Ann Florez-GYN    Have you had a mammogram?”   Done 6/26/24-[facility]    No breast cancer screening on file             Click Here for Release of Records Request  
   Lung Disease Mother         COPD    Heart Disease Father          08    Heart Attack Father     Heart Attack Brother     Heart Disease Paternal Grandfather          prior to 7/3/73    Heart Attack Paternal Grandfather     Lung Disease Maternal Grandmother         COPD    Cancer Maternal Grandmother         Breast Cancer after menopause -  20+ years ago       Social History     Socioeconomic History    Marital status:      Spouse name: Not on file    Number of children: Not on file    Years of education: Not on file    Highest education level: Not on file   Occupational History    Not on file   Tobacco Use    Smoking status: Former     Current packs/day: 1.00     Types: Cigarettes    Smokeless tobacco: Never   Substance and Sexual Activity    Alcohol use: Yes    Drug use: Never    Sexual activity: Not on file     Comment: n/a   Other Topics Concern    Not on file   Social History Narrative    Not on file     Social Determinants of Health     Financial Resource Strain: Low Risk  (2024)    Overall Financial Resource Strain (CARDIA)     Difficulty of Paying Living Expenses: Not hard at all   Food Insecurity: No Food Insecurity (2024)    Hunger Vital Sign     Worried About Running Out of Food in the Last Year: Never true     Ran Out of Food in the Last Year: Never true   Transportation Needs: Unknown (2024)    PRAPARE - Transportation     Lack of Transportation (Medical): Not on file     Lack of Transportation (Non-Medical): No   Physical Activity: Not on file   Stress: Not on file   Social Connections: Not on file   Intimate Partner Violence: Not on file   Housing Stability: Unknown (2024)    Housing Stability Vital Sign     Unable to Pay for Housing in the Last Year: Not on file     Number of Places Lived in the Last Year: Not on file     Unstable Housing in the Last Year: No          /71 (Site: Right Upper Arm, Position: Sitting, Cuff Size: Large Adult)   Pulse 77

## 2024-06-30 DIAGNOSIS — E66.9 OBESITY (BMI 30-39.9): Primary | ICD-10-CM

## 2024-06-30 RX ORDER — TIRZEPATIDE 2.5 MG/.5ML
2.5 INJECTION, SOLUTION SUBCUTANEOUS
Qty: 2 ML | Refills: 0 | Status: SHIPPED | OUTPATIENT
Start: 2024-06-30 | End: 2024-07-07 | Stop reason: SDUPTHER

## 2024-07-07 DIAGNOSIS — E66.9 OBESITY (BMI 30-39.9): ICD-10-CM

## 2024-07-07 RX ORDER — TIRZEPATIDE 2.5 MG/.5ML
2.5 INJECTION, SOLUTION SUBCUTANEOUS
Qty: 2 ML | Refills: 0 | Status: SHIPPED | OUTPATIENT
Start: 2024-07-07

## 2024-09-10 RX ORDER — BUPROPION HYDROCHLORIDE 150 MG/1
TABLET ORAL
Qty: 270 TABLET | Refills: 3 | Status: SHIPPED | OUTPATIENT
Start: 2024-09-10

## 2024-10-15 DIAGNOSIS — E66.9 OBESITY (BMI 30-39.9): Primary | ICD-10-CM

## 2024-10-15 RX ORDER — TIRZEPATIDE 5 MG/.5ML
5 INJECTION, SOLUTION SUBCUTANEOUS
Qty: 2 ML | Refills: 5 | Status: SHIPPED | OUTPATIENT
Start: 2024-10-15

## 2025-01-03 DIAGNOSIS — R11.0 NAUSEA: ICD-10-CM

## 2025-01-03 RX ORDER — ONDANSETRON 4 MG/1
4 TABLET, FILM COATED ORAL EVERY 8 HOURS PRN
Qty: 30 TABLET | Refills: 1 | Status: SHIPPED | OUTPATIENT
Start: 2025-01-03

## 2025-02-11 DIAGNOSIS — E66.9 OBESITY (BMI 30-39.9): Primary | ICD-10-CM

## 2025-03-09 RX ORDER — SIMVASTATIN 40 MG
40 TABLET ORAL NIGHTLY
Qty: 90 TABLET | Refills: 3 | Status: SHIPPED | OUTPATIENT
Start: 2025-03-09

## 2025-04-01 NOTE — TELEPHONE ENCOUNTER
Hello! Is it possible to call the 7.5 RX to the below, I'm hoping this might be cheaper.     [facility]  NPI: [id number]  NCPDP: [id number]     I found it on the internet, should cost ~$599?     Also, I'll look to make a yearly appointment for all the blood work and stuff!     Thanks in advance!  Faith

## 2025-04-07 DIAGNOSIS — F41.9 ANXIETY DISORDER, UNSPECIFIED: ICD-10-CM

## 2025-04-07 RX ORDER — ALPRAZOLAM 0.25 MG
TABLET ORAL
Qty: 60 TABLET | OUTPATIENT
Start: 2025-04-07 | End: 2026-04-02

## 2025-04-14 ENCOUNTER — OFFICE VISIT (OUTPATIENT)
Age: 50
End: 2025-04-14
Payer: COMMERCIAL

## 2025-04-14 VITALS
RESPIRATION RATE: 18 BRPM | BODY MASS INDEX: 27.08 KG/M2 | HEART RATE: 80 BPM | WEIGHT: 158.6 LBS | DIASTOLIC BLOOD PRESSURE: 81 MMHG | SYSTOLIC BLOOD PRESSURE: 116 MMHG | OXYGEN SATURATION: 98 % | HEIGHT: 64 IN | TEMPERATURE: 96.9 F

## 2025-04-14 DIAGNOSIS — S93.401A MODERATE RIGHT ANKLE SPRAIN, INITIAL ENCOUNTER: ICD-10-CM

## 2025-04-14 DIAGNOSIS — I49.9 CARDIAC ARRHYTHMIA, UNSPECIFIED CARDIAC ARRHYTHMIA TYPE: ICD-10-CM

## 2025-04-14 DIAGNOSIS — E78.2 MIXED HYPERLIPIDEMIA: ICD-10-CM

## 2025-04-14 DIAGNOSIS — E66.9 OBESITY (BMI 30-39.9): Primary | ICD-10-CM

## 2025-04-14 DIAGNOSIS — F41.1 GAD (GENERALIZED ANXIETY DISORDER): ICD-10-CM

## 2025-04-14 PROCEDURE — 99214 OFFICE O/P EST MOD 30 MIN: CPT | Performed by: INTERNAL MEDICINE

## 2025-04-14 RX ORDER — ALPRAZOLAM 0.25 MG
0.25 TABLET ORAL NIGHTLY PRN
Qty: 60 TABLET | Refills: 0 | Status: SHIPPED | OUTPATIENT
Start: 2025-04-14 | End: 2026-04-09

## 2025-04-14 SDOH — ECONOMIC STABILITY: INCOME INSECURITY: IN THE LAST 12 MONTHS, WAS THERE A TIME WHEN YOU WERE NOT ABLE TO PAY THE MORTGAGE OR RENT ON TIME?: NO

## 2025-04-14 SDOH — ECONOMIC STABILITY: FOOD INSECURITY: WITHIN THE PAST 12 MONTHS, YOU WORRIED THAT YOUR FOOD WOULD RUN OUT BEFORE YOU GOT MONEY TO BUY MORE.: NEVER TRUE

## 2025-04-14 SDOH — ECONOMIC STABILITY: FOOD INSECURITY: WITHIN THE PAST 12 MONTHS, THE FOOD YOU BOUGHT JUST DIDN'T LAST AND YOU DIDN'T HAVE MONEY TO GET MORE.: NEVER TRUE

## 2025-04-14 SDOH — ECONOMIC STABILITY: TRANSPORTATION INSECURITY
IN THE PAST 12 MONTHS, HAS THE LACK OF TRANSPORTATION KEPT YOU FROM MEDICAL APPOINTMENTS OR FROM GETTING MEDICATIONS?: NO

## 2025-04-14 SDOH — ECONOMIC STABILITY: TRANSPORTATION INSECURITY
IN THE PAST 12 MONTHS, HAS LACK OF TRANSPORTATION KEPT YOU FROM MEETINGS, WORK, OR FROM GETTING THINGS NEEDED FOR DAILY LIVING?: NO

## 2025-04-14 ASSESSMENT — PATIENT HEALTH QUESTIONNAIRE - PHQ9
8. MOVING OR SPEAKING SO SLOWLY THAT OTHER PEOPLE COULD HAVE NOTICED. OR THE OPPOSITE, BEING SO FIGETY OR RESTLESS THAT YOU HAVE BEEN MOVING AROUND A LOT MORE THAN USUAL: NOT AT ALL
6. FEELING BAD ABOUT YOURSELF - OR THAT YOU ARE A FAILURE OR HAVE LET YOURSELF OR YOUR FAMILY DOWN: NOT AT ALL
2. FEELING DOWN, DEPRESSED OR HOPELESS: NOT AT ALL
SUM OF ALL RESPONSES TO PHQ QUESTIONS 1-9: 0
5. POOR APPETITE OR OVEREATING: NOT AT ALL
1. LITTLE INTEREST OR PLEASURE IN DOING THINGS: NOT AT ALL
10. IF YOU CHECKED OFF ANY PROBLEMS, HOW DIFFICULT HAVE THESE PROBLEMS MADE IT FOR YOU TO DO YOUR WORK, TAKE CARE OF THINGS AT HOME, OR GET ALONG WITH OTHER PEOPLE: NOT DIFFICULT AT ALL
SUM OF ALL RESPONSES TO PHQ QUESTIONS 1-9: 0
9. THOUGHTS THAT YOU WOULD BE BETTER OFF DEAD, OR OF HURTING YOURSELF: NOT AT ALL
SUM OF ALL RESPONSES TO PHQ QUESTIONS 1-9: 0
4. FEELING TIRED OR HAVING LITTLE ENERGY: NOT AT ALL
3. TROUBLE FALLING OR STAYING ASLEEP: NOT AT ALL
7. TROUBLE CONCENTRATING ON THINGS, SUCH AS READING THE NEWSPAPER OR WATCHING TELEVISION: NOT AT ALL
SUM OF ALL RESPONSES TO PHQ QUESTIONS 1-9: 0

## 2025-04-14 NOTE — PROGRESS NOTES
\"Have you been to the ER, urgent care clinic since your last visit?  Hospitalized since your last visit?\"    no    “Have you seen or consulted any other health care providers outside our system since your last visit?”    no           
Threatens You: Not on file     Screams at You: Not on file     Physically Hurt: Not on file     Intimate Partner Violence Score: Not on file   Housing Stability: Low Risk  (4/14/2025)    Housing Stability Vital Sign     Unable to Pay for Housing in the Last Year: No     Number of Times Moved in the Last Year: 0     Homeless in the Last Year: No          /81 (BP Site: Left Upper Arm, Patient Position: Sitting, BP Cuff Size: Medium Adult)   Pulse 80   Temp 96.9 °F (36.1 °C) (Temporal)   Resp 18   Ht 1.626 m (5' 4\")   Wt 71.9 kg (158 lb 9.6 oz)   SpO2 98%   BMI 27.22 kg/m²     Physical Examination:   General - Well appearing female  HEENT - PERRL, TM no erythema/opacification, normal nasal turbinates, no oropharyngeal erythema or exudate, MMM  Neck - supple, no bruits, no thyroidomegaly, no lymphadenopathy  Pulm - clear to auscultation bilaterally  Cardio - RRR, normal S1 S2, no murmur  Abd - soft, nontender, no masses, no HSM  Extrem - no edema, +2 distal pulses  Neuro-  No focal deficits, CN intact     Assessment/Plan:     Obesity, bmi 27.22--weight down 26 lbs since June.  Continue zepbound.  Just increased dose to 7.5 mg today.  Hyperlipids--continue zocor.  Stop zetia.  Will check labs at next visit in 3 months  GARRY--continue trintellix.  Wean off wellbutrin.  Currently on 450 mg qam.  Decrease dose to 300 mg daily x 4 weeks, then 150 mg daily x 4 weeks, then stop.  Anxiety, insomnia--refill given for xanax  Cardiac arrhythmia--continue bystolic for now.  Consider echo at next visit    Rtc 3 months for cpe        Italo Quiñonez, DO

## 2025-04-14 NOTE — PATIENT INSTRUCTIONS
Take 2 wellbutrin each morning for next 4 weeks, then cut down to 1 pill each morning for 4 weeks, then stop.      Ice right ankle for 10-15 minutes twice daily for next 7 days.

## 2025-04-28 RX ORDER — NEBIVOLOL 10 MG/1
10 TABLET ORAL DAILY
Qty: 90 TABLET | Refills: 3 | Status: SHIPPED | OUTPATIENT
Start: 2025-04-28

## 2025-04-28 RX ORDER — NEBIVOLOL 10 MG/1
10 TABLET ORAL DAILY
Qty: 90 TABLET | Refills: 3 | OUTPATIENT
Start: 2025-04-28

## 2025-04-29 DIAGNOSIS — E66.9 OBESITY (BMI 30-39.9): ICD-10-CM

## 2025-04-29 NOTE — TELEPHONE ENCOUNTER
PCP: Italo Quiñonez III,     Last appt:   4/14/2025    Future Appointments   Date Time Provider Department Center   7/29/2025  9:20 AM Italo Quiñonez III, DO [facility] DE[facility]       Requested Prescriptions     Pending Prescriptions Disp Refills    tirzepatide-weight management (ZEPBOUND) 7.5 MG/0.5ML SOAJ subCUTAneous auto-injector pen 2 mL 5     Sig: Inject 7.5 mg into the skin every 7 days

## 2025-06-23 RX ORDER — SIMVASTATIN 40 MG
40 TABLET ORAL NIGHTLY
Qty: 90 TABLET | Refills: 3 | Status: SHIPPED | OUTPATIENT
Start: 2025-06-23

## 2025-07-29 ENCOUNTER — OFFICE VISIT (OUTPATIENT)
Age: 50
End: 2025-07-29
Payer: COMMERCIAL

## 2025-07-29 VITALS
RESPIRATION RATE: 16 BRPM | HEIGHT: 64 IN | TEMPERATURE: 97.6 F | HEART RATE: 85 BPM | DIASTOLIC BLOOD PRESSURE: 90 MMHG | OXYGEN SATURATION: 100 % | WEIGHT: 151 LBS | BODY MASS INDEX: 25.78 KG/M2 | SYSTOLIC BLOOD PRESSURE: 136 MMHG

## 2025-07-29 DIAGNOSIS — N95.1 PERIMENOPAUSAL: ICD-10-CM

## 2025-07-29 DIAGNOSIS — I49.9 CARDIAC ARRHYTHMIA, UNSPECIFIED CARDIAC ARRHYTHMIA TYPE: ICD-10-CM

## 2025-07-29 DIAGNOSIS — R73.03 PREDIABETES: ICD-10-CM

## 2025-07-29 DIAGNOSIS — E78.2 MIXED HYPERLIPIDEMIA: ICD-10-CM

## 2025-07-29 DIAGNOSIS — Z00.00 ANNUAL PHYSICAL EXAM: Primary | ICD-10-CM

## 2025-07-29 DIAGNOSIS — Z91.81 FALLS INFREQUENTLY: ICD-10-CM

## 2025-07-29 DIAGNOSIS — I95.1 ORTHOSTATIC HYPOTENSION: ICD-10-CM

## 2025-07-29 DIAGNOSIS — Z00.00 ANNUAL PHYSICAL EXAM: ICD-10-CM

## 2025-07-29 DIAGNOSIS — R55 SYNCOPE, UNSPECIFIED SYNCOPE TYPE: ICD-10-CM

## 2025-07-29 DIAGNOSIS — F41.1 GAD (GENERALIZED ANXIETY DISORDER): ICD-10-CM

## 2025-07-29 PROCEDURE — 99396 PREV VISIT EST AGE 40-64: CPT | Performed by: INTERNAL MEDICINE

## 2025-07-29 RX ORDER — VALACYCLOVIR HYDROCHLORIDE 1 G/1
1000 TABLET, FILM COATED ORAL DAILY PRN
Qty: 10 TABLET | Refills: 1 | Status: SHIPPED | OUTPATIENT
Start: 2025-07-29

## 2025-07-29 RX ORDER — M-VIT,TX,IRON,MINS/CALC/FOLIC 27MG-0.4MG
1 TABLET ORAL DAILY
COMMUNITY

## 2025-07-29 RX ORDER — MUPIROCIN 2 %
OINTMENT (GRAM) TOPICAL 3 TIMES DAILY
COMMUNITY
End: 2025-07-29 | Stop reason: SDUPTHER

## 2025-07-29 RX ORDER — VALACYCLOVIR HYDROCHLORIDE 1 G/1
1000 TABLET, FILM COATED ORAL PRN
COMMUNITY
End: 2025-07-29 | Stop reason: SDUPTHER

## 2025-07-29 RX ORDER — GINSENG 100 MG
50 CAPSULE ORAL PRN
COMMUNITY

## 2025-07-29 RX ORDER — MUPIROCIN 2 %
OINTMENT (GRAM) TOPICAL 3 TIMES DAILY
Qty: 1 G | Refills: 1 | Status: SHIPPED | OUTPATIENT
Start: 2025-07-29

## 2025-07-29 NOTE — PATIENT INSTRUCTIONS
Make sure to stay better hydrated.  Sipping on water throughout the day will help.    If you have any more falling/passing out spells, let me know.

## 2025-07-29 NOTE — PROGRESS NOTES
Faith Ji is a 50 y.o. female who presents for evaluation of annual cpe.  Last seen by me 2025 when she had fallen and sprained her right ankle.  She had done well until just this past weekend, when she had another fall/syncopal episode.  They have all occurred when she has gotten up from bed or a chair very quickly, taken a step or two, then went down.   Weight down another 7 lbs.  Overall tolerating zepbound well.   She wonders if she is menopausal, would like to check hormone levels.      ROS:  Constitutional: negative for fevers, chills, anorexia and weight loss  Eyes:   negative for visual disturbance and irritation  ENT:   negative for tinnitus,sore throat,nasal congestion,ear pain,hoarseness  Respiratory:  negative for cough, hemoptysis, dyspnea,wheezing  CV:   negative for chest pain, palpitations, lower extremity edema  GI:   negative for nausea, vomiting, diarrhea, abdominal pain,melena  Genitourinary: negative for frequency, dysuria and hematuria  Musculoskel: negative for myalgias, arthralgias, back pain, muscle weakness, joint pain  Neurological:  negative for headaches, dizziness, focal weakness, numbness  Psychiatric:     Negative for depression or anxiety      Past Medical History:   Diagnosis Date    Anxiety     Arthritis 2015    Neck    Depression     Dr. Phuong Turk    GERD (gastroesophageal reflux disease)     Hemorrhage 2002    postpartum (23 units transfused)    Hypercholesterolemia        Past Surgical History:   Procedure Laterality Date    ADENOIDECTOMY      +partial tonsillectomy    ADENOIDECTOMY       SECTION  2002    COLONOSCOPY  20    Next appt 5 years    DILATION AND CURETTAGE OF UTERUS      HERNIA REPAIR  2020    HYSTERECTOMY (CERVIX STATUS UNKNOWN)  2002    ORTHOPEDIC SURGERY  2006    Left Shoulder Slap Tear & torn muscle    PARTIAL HYSTERECTOMY (CERVIX NOT REMOVED)  2002    UPPER GASTROINTESTINAL ENDOSCOPY  2020    [employer]       [facility]

## 2025-07-30 LAB
ALBUMIN SERPL-MCNC: 4 G/DL (ref 3.5–5.2)
ALBUMIN/GLOB SERPL: 1.4 (ref 1.1–2.2)
ALP SERPL-CCNC: 61 U/L (ref 35–104)
ALT SERPL-CCNC: 32 U/L (ref 10–35)
ANION GAP SERPL CALC-SCNC: 11 MMOL/L (ref 2–14)
AST SERPL-CCNC: 45 U/L (ref 10–35)
BASOPHILS # BLD: 0.02 K/UL (ref 0–0.1)
BASOPHILS NFR BLD: 0.7 % (ref 0–1)
BILIRUB SERPL-MCNC: 0.3 MG/DL (ref 0–1.2)
BUN SERPL-MCNC: 9 MG/DL (ref 6–20)
BUN/CREAT SERPL: 10 (ref 12–20)
CALCIUM SERPL-MCNC: 9.9 MG/DL (ref 8.6–10)
CHLORIDE SERPL-SCNC: 103 MMOL/L (ref 98–107)
CHOLEST SERPL-MCNC: 181 MG/DL (ref 0–200)
CO2 SERPL-SCNC: 26 MMOL/L (ref 20–29)
CREAT SERPL-MCNC: 0.92 MG/DL (ref 0.6–1)
CRP SERPL HS-MCNC: 9.2 MG/L (ref 0–5)
DIFFERENTIAL METHOD BLD: ABNORMAL
EOSINOPHIL # BLD: 0.24 K/UL (ref 0–0.4)
EOSINOPHIL NFR BLD: 8.2 % (ref 0–7)
ERYTHROCYTE [DISTWIDTH] IN BLOOD BY AUTOMATED COUNT: 11.3 % (ref 11.5–14.5)
EST. AVERAGE GLUCOSE BLD GHB EST-MCNC: 87 MG/DL
FSH SERPL-ACNC: 94.9 MIU/ML
GLOBULIN SER CALC-MCNC: 2.8 G/DL (ref 2–4)
GLUCOSE SERPL-MCNC: 79 MG/DL (ref 65–100)
HBA1C MFR BLD: 4.7 % (ref 4–5.6)
HCT VFR BLD AUTO: 37 % (ref 35–47)
HDLC SERPL-MCNC: 76 MG/DL (ref 40–60)
HDLC SERPL: 2.4
HGB BLD-MCNC: 12.8 G/DL (ref 11.5–16)
IMM GRANULOCYTES # BLD AUTO: 0.01 K/UL (ref 0–0.04)
IMM GRANULOCYTES NFR BLD AUTO: 0.3 % (ref 0–0.5)
LDLC SERPL CALC-MCNC: 84 MG/DL
LH SERPL-ACNC: 51.2 MIU/ML
LYMPHOCYTES # BLD: 1.32 K/UL (ref 0.8–3.5)
LYMPHOCYTES NFR BLD: 45.1 % (ref 12–49)
MCH RBC QN AUTO: 31.5 PG (ref 26–34)
MCHC RBC AUTO-ENTMCNC: 34.6 G/DL (ref 30–36.5)
MCV RBC AUTO: 91.1 FL (ref 80–99)
MONOCYTES # BLD: 0.27 K/UL (ref 0–1)
MONOCYTES NFR BLD: 9.2 % (ref 5–13)
NEUTS SEG # BLD: 1.07 K/UL (ref 1.8–8)
NEUTS SEG NFR BLD: 36.5 % (ref 32–75)
NRBC # BLD: 0 K/UL (ref 0–0.01)
NRBC BLD-RTO: 0 PER 100 WBC
PLATELET # BLD AUTO: 183 K/UL (ref 150–400)
PMV BLD AUTO: 11.9 FL (ref 8.9–12.9)
POTASSIUM SERPL-SCNC: 5.2 MMOL/L (ref 3.5–5.1)
PROT SERPL-MCNC: 6.8 G/DL (ref 6.4–8.3)
RBC # BLD AUTO: 4.06 M/UL (ref 3.8–5.2)
SODIUM SERPL-SCNC: 140 MMOL/L (ref 136–145)
TRIGL SERPL-MCNC: 105 MG/DL (ref 0–150)
TSH, 3RD GENERATION: 2.19 UIU/ML (ref 0.27–4.2)
VLDLC SERPL CALC-MCNC: 21 MG/DL
WBC # BLD AUTO: 2.9 K/UL (ref 3.6–11)

## 2025-07-31 LAB — LPA SERPL-SCNC: 32.1 NMOL/L

## 2025-08-03 RX ORDER — NEBIVOLOL 10 MG/1
10 TABLET ORAL DAILY
Qty: 90 TABLET | Refills: 3 | Status: SHIPPED | OUTPATIENT
Start: 2025-08-03

## 2025-09-04 ENCOUNTER — HOSPITAL ENCOUNTER (OUTPATIENT)
Facility: HOSPITAL | Age: 50
Discharge: HOME | End: 2025-09-06
Attending: INTERNAL MEDICINE
Payer: COMMERCIAL

## 2025-09-04 DIAGNOSIS — R55 SYNCOPE, UNSPECIFIED SYNCOPE TYPE: ICD-10-CM

## 2025-09-04 LAB
ECHO AO ROOT DIAM: 2.8 CM
ECHO AV AREA PEAK VELOCITY: 2.1 CM2
ECHO AV PEAK GRADIENT: 3 MMHG
ECHO AV PEAK VELOCITY: 0.9 M/S
ECHO AV VELOCITY RATIO: 1.11
ECHO EST RA PRESSURE: 3 MMHG
ECHO LA DIAMETER: 2.7 CM
ECHO LA TO AORTIC ROOT RATIO: 0.96
ECHO LA VOL A-L A2C: 26 ML (ref 22–52)
ECHO LA VOL A-L A4C: 38 ML (ref 22–52)
ECHO LA VOL MOD A2C: 23 ML (ref 22–52)
ECHO LA VOL MOD A4C: 31 ML (ref 22–52)
ECHO LA VOLUME AREA LENGTH: 35 ML
ECHO LV E' LATERAL VELOCITY: 7.87 CM/S
ECHO LV E' SEPTAL VELOCITY: 5.78 CM/S
ECHO LV EDV A2C: 41 ML
ECHO LV EDV A4C: 41 ML
ECHO LV EDV BP: 42 ML (ref 56–104)
ECHO LV EF PHYSICIAN: 65 %
ECHO LV EJECTION FRACTION A2C: 74 %
ECHO LV EJECTION FRACTION A4C: 61 %
ECHO LV EJECTION FRACTION BIPLANE: 68 % (ref 55–100)
ECHO LV ESV A2C: 11 ML
ECHO LV ESV A4C: 16 ML
ECHO LV ESV BP: 13 ML (ref 19–49)
ECHO LV FRACTIONAL SHORTENING: 39 % (ref 28–44)
ECHO LV GLOBAL LONGITUDINAL STRAIN (GLS): -15.3 %
ECHO LV GLOBAL LONGITUDINAL STRAIN (GLS): -17.1 %
ECHO LV GLOBAL LONGITUDINAL STRAIN (GLS): -17.2 %
ECHO LV GLOBAL LONGITUDINAL STRAIN (GLS): -18.7 %
ECHO LV INTERNAL DIMENSION DIASTOLIC: 4.4 CM (ref 3.9–5.3)
ECHO LV INTERNAL DIMENSION SYSTOLIC: 2.7 CM
ECHO LV IVSD: 0.9 CM (ref 0.6–0.9)
ECHO LV MASS 2D: 147.8 G (ref 67–162)
ECHO LV POSTERIOR WALL DIASTOLIC: 1.1 CM (ref 0.6–0.9)
ECHO LV RELATIVE WALL THICKNESS RATIO: 0.5
ECHO LVOT AREA: 1.8 CM2
ECHO LVOT DIAM: 1.5 CM
ECHO LVOT PEAK GRADIENT: 4 MMHG
ECHO LVOT PEAK VELOCITY: 1 M/S
ECHO MV A VELOCITY: 0.63 M/S
ECHO MV E DECELERATION TIME (DT): 329.4 MS
ECHO MV E VELOCITY: 0.58 M/S
ECHO MV E/A RATIO: 0.92
ECHO MV E/E' LATERAL: 7.37
ECHO MV E/E' RATIO (AVERAGED): 8.7
ECHO MV E/E' SEPTAL: 10.03
ECHO RIGHT VENTRICULAR SYSTOLIC PRESSURE (RVSP): 18 MMHG
ECHO RV INTERNAL DIMENSION: 2.3 CM
ECHO RV TAPSE: 1.4 CM (ref 1.7–?)
ECHO TV REGURGITANT MAX VELOCITY: 1.94 M/S
ECHO TV REGURGITANT PEAK GRADIENT: 15 MMHG

## 2025-09-04 PROCEDURE — 93306 TTE W/DOPPLER COMPLETE: CPT
